# Patient Record
Sex: FEMALE | Race: WHITE | NOT HISPANIC OR LATINO
[De-identification: names, ages, dates, MRNs, and addresses within clinical notes are randomized per-mention and may not be internally consistent; named-entity substitution may affect disease eponyms.]

---

## 2022-07-19 VITALS
TEMPERATURE: 96.3 F | HEART RATE: 70 BPM | WEIGHT: 158.73 LBS | BODY MASS INDEX: 27.1 KG/M2 | HEIGHT: 64 IN | SYSTOLIC BLOOD PRESSURE: 130 MMHG | DIASTOLIC BLOOD PRESSURE: 70 MMHG

## 2022-07-19 VITALS
TEMPERATURE: 96.3 F | BODY MASS INDEX: 27.1 KG/M2 | WEIGHT: 158.73 LBS | SYSTOLIC BLOOD PRESSURE: 130 MMHG | HEIGHT: 64 IN | DIASTOLIC BLOOD PRESSURE: 70 MMHG | HEART RATE: 70 BPM

## 2022-12-01 ENCOUNTER — APPOINTMENT (OUTPATIENT)
Dept: CARDIOLOGY | Facility: CLINIC | Age: 62
End: 2022-12-01

## 2022-12-01 ENCOUNTER — RESULT REVIEW (OUTPATIENT)
Age: 62
End: 2022-12-01

## 2022-12-01 PROCEDURE — 93306 TTE W/DOPPLER COMPLETE: CPT

## 2022-12-05 DIAGNOSIS — Z78.9 OTHER SPECIFIED HEALTH STATUS: ICD-10-CM

## 2022-12-05 RX ORDER — WARFARIN 7.5 MG/1
7.5 TABLET ORAL
Refills: 0 | Status: ACTIVE | COMMUNITY

## 2022-12-05 RX ORDER — FLUOXETINE HYDROCHLORIDE 40 MG/1
40 CAPSULE ORAL
Refills: 0 | Status: ACTIVE | COMMUNITY

## 2022-12-05 RX ORDER — HYDROXYCHLOROQUINE SULFATE 200 MG/1
200 TABLET ORAL
Refills: 0 | Status: ACTIVE | COMMUNITY

## 2023-01-13 ENCOUNTER — NON-APPOINTMENT (OUTPATIENT)
Age: 63
End: 2023-01-13

## 2023-01-13 RX ORDER — FLUOXETINE HYDROCHLORIDE 40 MG/1
40 CAPSULE ORAL
Refills: 0 | Status: ACTIVE | COMMUNITY

## 2023-01-18 ENCOUNTER — APPOINTMENT (OUTPATIENT)
Dept: CARDIOLOGY | Facility: CLINIC | Age: 63
End: 2023-01-18

## 2023-04-10 ENCOUNTER — APPOINTMENT (OUTPATIENT)
Dept: CARDIOLOGY | Facility: CLINIC | Age: 63
End: 2023-04-10
Payer: COMMERCIAL

## 2023-04-10 VITALS — WEIGHT: 152 LBS | BODY MASS INDEX: 27.97 KG/M2 | HEIGHT: 62 IN

## 2023-04-10 PROCEDURE — 99204 OFFICE O/P NEW MOD 45 MIN: CPT

## 2023-04-10 NOTE — DISCUSSION/SUMMARY
[FreeTextEntry1] : pt with abnormal echocardiogram \par pt echo with ? septal/inferior hypo with GLS down \par will get lexiscan stress nuclear as unable to exercise on treadmill \par get bloodwork.

## 2023-04-10 NOTE — HISTORY OF PRESENT ILLNESS
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 OA, DD@ \par \par pt f/u with INR with DR. TINOCO \par \par 12/22: lvef 50%, mid septal ant/inf hypokinesis, E/e': 18 e' sept: 0.06 m/s GLS -17% mild LAE \par pt had covid in january and pt says not having symptoms. \par pt says not sob and not with fast exertion. pt rides a stationary bike and walks dog daily. \par pt denies cp.

## 2023-04-10 NOTE — PHYSICAL EXAM
[Normal Venous Pressure] : normal venous pressure [Normal S1, S2] : normal S1, S2 [Clear Lung Fields] : clear lung fields [Soft] : abdomen soft [No Edema] : no edema

## 2023-05-03 ENCOUNTER — OUTPATIENT (OUTPATIENT)
Dept: OUTPATIENT SERVICES | Facility: HOSPITAL | Age: 63
LOS: 1 days | End: 2023-05-03
Payer: COMMERCIAL

## 2023-05-03 DIAGNOSIS — Z00.8 ENCOUNTER FOR OTHER GENERAL EXAMINATION: ICD-10-CM

## 2023-05-03 DIAGNOSIS — I50.32 CHRONIC DIASTOLIC (CONGESTIVE) HEART FAILURE: ICD-10-CM

## 2023-05-03 PROCEDURE — 78452 HT MUSCLE IMAGE SPECT MULT: CPT | Mod: 26

## 2023-05-03 PROCEDURE — 93018 CV STRESS TEST I&R ONLY: CPT

## 2023-05-03 PROCEDURE — A9500: CPT

## 2023-05-03 PROCEDURE — 78452 HT MUSCLE IMAGE SPECT MULT: CPT

## 2023-05-04 DIAGNOSIS — I50.32 CHRONIC DIASTOLIC (CONGESTIVE) HEART FAILURE: ICD-10-CM

## 2023-05-12 DIAGNOSIS — R07.9 CHEST PAIN, UNSPECIFIED: ICD-10-CM

## 2023-05-13 DIAGNOSIS — R07.9 CHEST PAIN, UNSPECIFIED: ICD-10-CM

## 2023-10-16 ENCOUNTER — APPOINTMENT (OUTPATIENT)
Dept: CARDIOLOGY | Facility: CLINIC | Age: 63
End: 2023-10-16

## 2023-10-23 ENCOUNTER — APPOINTMENT (OUTPATIENT)
Dept: CARDIOLOGY | Facility: CLINIC | Age: 63
End: 2023-10-23
Payer: COMMERCIAL

## 2023-10-23 VITALS — BODY MASS INDEX: 28.52 KG/M2 | HEART RATE: 89 BPM | HEIGHT: 62 IN | WEIGHT: 155 LBS | OXYGEN SATURATION: 99 %

## 2023-10-23 DIAGNOSIS — R07.89 OTHER CHEST PAIN: ICD-10-CM

## 2023-10-23 DIAGNOSIS — R94.31 ABNORMAL ELECTROCARDIOGRAM [ECG] [EKG]: ICD-10-CM

## 2023-10-23 PROCEDURE — 99214 OFFICE O/P EST MOD 30 MIN: CPT

## 2024-01-03 RX ORDER — NIFEDIPINE 30 MG/1
30 TABLET, EXTENDED RELEASE ORAL DAILY
Qty: 90 | Refills: 3 | Status: ACTIVE | COMMUNITY
Start: 1900-01-01 | End: 1900-01-01

## 2024-03-28 ENCOUNTER — APPOINTMENT (OUTPATIENT)
Dept: CARDIOLOGY | Facility: CLINIC | Age: 64
End: 2024-03-28
Payer: COMMERCIAL

## 2024-03-28 PROCEDURE — 93306 TTE W/DOPPLER COMPLETE: CPT

## 2024-04-22 ENCOUNTER — APPOINTMENT (OUTPATIENT)
Dept: CARDIOLOGY | Facility: CLINIC | Age: 64
End: 2024-04-22
Payer: COMMERCIAL

## 2024-04-22 VITALS — DIASTOLIC BLOOD PRESSURE: 78 MMHG | SYSTOLIC BLOOD PRESSURE: 124 MMHG | HEART RATE: 60 BPM

## 2024-04-22 VITALS — BODY MASS INDEX: 28.16 KG/M2 | WEIGHT: 153 LBS | HEIGHT: 62 IN

## 2024-04-22 DIAGNOSIS — I50.32 CHRONIC DIASTOLIC (CONGESTIVE) HEART FAILURE: ICD-10-CM

## 2024-04-22 DIAGNOSIS — Z00.00 ENCOUNTER FOR GENERAL ADULT MEDICAL EXAMINATION W/OUT ABNORMAL FINDINGS: ICD-10-CM

## 2024-04-22 DIAGNOSIS — M32.9 SYSTEMIC LUPUS ERYTHEMATOSUS, UNSPECIFIED: ICD-10-CM

## 2024-04-22 DIAGNOSIS — D68.62 LUPUS ANTICOAGULANT SYNDROME: ICD-10-CM

## 2024-04-22 DIAGNOSIS — I82.409 ACUTE EMBOLISM AND THROMBOSIS OF UNSPECIFIED DEEP VEINS OF UNSPECIFIED LOWER EXTREMITY: ICD-10-CM

## 2024-04-22 DIAGNOSIS — D68.59 OTHER PRIMARY THROMBOPHILIA: ICD-10-CM

## 2024-04-22 PROCEDURE — 99214 OFFICE O/P EST MOD 30 MIN: CPT | Mod: 25

## 2024-04-22 PROCEDURE — 93000 ELECTROCARDIOGRAM COMPLETE: CPT

## 2024-04-22 NOTE — HISTORY OF PRESENT ILLNESS
[FreeTextEntry1] : pt with SLE, SJOGRENS SYNDROME, APA on  coumadin after DVT/PE in  , repeat DVT in 2019  while on coumadin, CALISTA on EKG. OA, DD2 echo: mild septal anterior inferior hypokinesis   pt f/u with INR with DR. TINOCO (HEM/ONC in [place].   : lvef 50%, mid septal ant/inf hypokinesis, E/e': 18 e' sept: 0.06 m/s GLS -17% mild LAE  pt had covid in january and pt says not having symptoms.  pt says not sob and not with fast exertion. pt rides a stationary bike and walks dog daily.  pt denies cp.   10/23/23:  23: Normal myocardial perfusion. No evidence of ischemia or infarction. lvef 65% . HDL: 50, T, LDL: 82, BNP: 15 WBC: 2.5 and patient here for f/u, pt had elevated wbc, pt having a cough all summer intemittently and no sob. pt says SLE is stable. pt denies wheezing. pt was on steroids in july for her eyes.   24: 3/28/24: EF: 50-55%, E' sept: 0.07 m/s E/e': 10.5 LVOT VTI: 18.3 cm, GLS: -17.9%, DD1, borderline LAE, trace TR, JANY lvfp improved vs past and LAE size improved.  :  wbc: 2.5  EKG: nsr with LAE  pt says walking the dog and some pilates at home, no cp or sob pt denies cp of sob and active. pt has fatigue with lupus at times but no exacerbations.  u/a proteinuria 1+ f/u pmd.

## 2024-04-22 NOTE — DISCUSSION/SUMMARY
[FreeTextEntry1] : pt with abnormal echocardiogram  pt echo with GLS down  STRESS NUCLEAR Negative 2023  continue nifedipine 30 mg  continue coumadin f/u hem/onc  f/u rheumatology on plaquenil  get bloodwork. f/u in 6 months  get venous u/s.  [EKG obtained to assist in diagnosis and management of assessed problem(s)] : EKG obtained to assist in diagnosis and management of assessed problem(s)

## 2024-07-17 ENCOUNTER — APPOINTMENT (OUTPATIENT)
Dept: CARDIOLOGY | Facility: CLINIC | Age: 64
End: 2024-07-17

## 2024-07-17 PROCEDURE — 93970 EXTREMITY STUDY: CPT

## 2024-10-17 ENCOUNTER — APPOINTMENT (OUTPATIENT)
Dept: CARDIOLOGY | Facility: CLINIC | Age: 64
End: 2024-10-17
Payer: COMMERCIAL

## 2024-10-17 VITALS
DIASTOLIC BLOOD PRESSURE: 79 MMHG | HEART RATE: 80 BPM | WEIGHT: 155 LBS | BODY MASS INDEX: 28.52 KG/M2 | SYSTOLIC BLOOD PRESSURE: 150 MMHG | HEIGHT: 62 IN

## 2024-10-17 DIAGNOSIS — M32.9 SYSTEMIC LUPUS ERYTHEMATOSUS, UNSPECIFIED: ICD-10-CM

## 2024-10-17 DIAGNOSIS — I50.32 CHRONIC DIASTOLIC (CONGESTIVE) HEART FAILURE: ICD-10-CM

## 2024-10-17 DIAGNOSIS — D68.59 OTHER PRIMARY THROMBOPHILIA: ICD-10-CM

## 2024-10-17 DIAGNOSIS — D68.62 LUPUS ANTICOAGULANT SYNDROME: ICD-10-CM

## 2024-10-17 DIAGNOSIS — R94.31 ABNORMAL ELECTROCARDIOGRAM [ECG] [EKG]: ICD-10-CM

## 2024-10-17 DIAGNOSIS — I82.409 ACUTE EMBOLISM AND THROMBOSIS OF UNSPECIFIED DEEP VEINS OF UNSPECIFIED LOWER EXTREMITY: ICD-10-CM

## 2024-10-17 PROCEDURE — 99214 OFFICE O/P EST MOD 30 MIN: CPT

## 2024-10-17 PROCEDURE — G2211 COMPLEX E/M VISIT ADD ON: CPT | Mod: NC

## 2025-04-07 ENCOUNTER — INPATIENT (INPATIENT)
Facility: HOSPITAL | Age: 65
LOS: 8 days | Discharge: HOME HEALTH | DRG: 813 | End: 2025-04-16
Attending: INTERNAL MEDICINE | Admitting: INTERNAL MEDICINE
Payer: COMMERCIAL

## 2025-04-07 ENCOUNTER — APPOINTMENT (OUTPATIENT)
Dept: CARDIOLOGY | Facility: CLINIC | Age: 65
End: 2025-04-07

## 2025-04-07 VITALS
WEIGHT: 160.06 LBS | RESPIRATION RATE: 18 BRPM | DIASTOLIC BLOOD PRESSURE: 61 MMHG | HEART RATE: 76 BPM | TEMPERATURE: 98 F | OXYGEN SATURATION: 98 % | SYSTOLIC BLOOD PRESSURE: 91 MMHG

## 2025-04-07 DIAGNOSIS — R79.1 ABNORMAL COAGULATION PROFILE: ICD-10-CM

## 2025-04-07 LAB
ALBUMIN SERPL ELPH-MCNC: 3.5 G/DL — SIGNIFICANT CHANGE UP (ref 3.5–5.2)
ALP SERPL-CCNC: 51 U/L — SIGNIFICANT CHANGE UP (ref 30–115)
ALT FLD-CCNC: 23 U/L — SIGNIFICANT CHANGE UP (ref 0–41)
ANION GAP SERPL CALC-SCNC: 12 MMOL/L — SIGNIFICANT CHANGE UP (ref 7–14)
APPEARANCE UR: ABNORMAL
APTT BLD: 78.1 SEC — CRITICAL HIGH (ref 27–39.2)
APTT BLD: 79.5 SEC — CRITICAL HIGH (ref 27–39.2)
AST SERPL-CCNC: 34 U/L — SIGNIFICANT CHANGE UP (ref 0–41)
BASOPHILS # BLD AUTO: 0.03 K/UL — SIGNIFICANT CHANGE UP (ref 0–0.2)
BASOPHILS NFR BLD AUTO: 0.4 % — SIGNIFICANT CHANGE UP (ref 0–1)
BILIRUB SERPL-MCNC: 0.7 MG/DL — SIGNIFICANT CHANGE UP (ref 0.2–1.2)
BILIRUB UR-MCNC: ABNORMAL
BUN SERPL-MCNC: 18 MG/DL — SIGNIFICANT CHANGE UP (ref 10–20)
CALCIUM SERPL-MCNC: 8.5 MG/DL — SIGNIFICANT CHANGE UP (ref 8.4–10.5)
CHLORIDE SERPL-SCNC: 97 MMOL/L — LOW (ref 98–110)
CO2 SERPL-SCNC: 22 MMOL/L — SIGNIFICANT CHANGE UP (ref 17–32)
COLOR SPEC: ABNORMAL
CREAT SERPL-MCNC: 0.7 MG/DL — SIGNIFICANT CHANGE UP (ref 0.7–1.5)
DIFF PNL FLD: ABNORMAL
EGFR: 97 ML/MIN/1.73M2 — SIGNIFICANT CHANGE UP
EGFR: 97 ML/MIN/1.73M2 — SIGNIFICANT CHANGE UP
EOSINOPHIL # BLD AUTO: 0.02 K/UL — SIGNIFICANT CHANGE UP (ref 0–0.7)
EOSINOPHIL NFR BLD AUTO: 0.3 % — SIGNIFICANT CHANGE UP (ref 0–8)
GAS PNL BLDV: SIGNIFICANT CHANGE UP
GLUCOSE SERPL-MCNC: 97 MG/DL — SIGNIFICANT CHANGE UP (ref 70–99)
GLUCOSE UR QL: NEGATIVE MG/DL — SIGNIFICANT CHANGE UP
HCT VFR BLD CALC: 24.8 % — LOW (ref 37–47)
HCT VFR BLD CALC: 26.8 % — LOW (ref 37–47)
HGB BLD-MCNC: 8.6 G/DL — LOW (ref 12–16)
HGB BLD-MCNC: 9.3 G/DL — LOW (ref 12–16)
IMM GRANULOCYTES NFR BLD AUTO: 0.4 % — HIGH (ref 0.1–0.3)
INR BLD: 13.12 RATIO — CRITICAL HIGH (ref 0.65–1.3)
INR BLD: 13.97 RATIO — CRITICAL HIGH (ref 0.65–1.3)
KETONES UR-MCNC: NEGATIVE MG/DL — SIGNIFICANT CHANGE UP
LEUKOCYTE ESTERASE UR-ACNC: ABNORMAL
LIDOCAIN IGE QN: 28 U/L — SIGNIFICANT CHANGE UP (ref 7–60)
LYMPHOCYTES # BLD AUTO: 0.61 K/UL — LOW (ref 1.2–3.4)
LYMPHOCYTES # BLD AUTO: 8.8 % — LOW (ref 20.5–51.1)
MAGNESIUM SERPL-MCNC: 1.9 MG/DL — SIGNIFICANT CHANGE UP (ref 1.8–2.4)
MCHC RBC-ENTMCNC: 30.1 PG — SIGNIFICANT CHANGE UP (ref 27–31)
MCHC RBC-ENTMCNC: 30.2 PG — SIGNIFICANT CHANGE UP (ref 27–31)
MCHC RBC-ENTMCNC: 34.7 G/DL — SIGNIFICANT CHANGE UP (ref 32–37)
MCHC RBC-ENTMCNC: 34.7 G/DL — SIGNIFICANT CHANGE UP (ref 32–37)
MCV RBC AUTO: 86.7 FL — SIGNIFICANT CHANGE UP (ref 81–99)
MCV RBC AUTO: 87 FL — SIGNIFICANT CHANGE UP (ref 81–99)
MONOCYTES # BLD AUTO: 0.72 K/UL — HIGH (ref 0.1–0.6)
MONOCYTES NFR BLD AUTO: 10.4 % — HIGH (ref 1.7–9.3)
NEUTROPHILS # BLD AUTO: 5.49 K/UL — SIGNIFICANT CHANGE UP (ref 1.4–6.5)
NEUTROPHILS NFR BLD AUTO: 79.7 % — HIGH (ref 42.2–75.2)
NITRITE UR-MCNC: POSITIVE
NRBC BLD AUTO-RTO: 0 /100 WBCS — SIGNIFICANT CHANGE UP (ref 0–0)
NRBC BLD AUTO-RTO: 0 /100 WBCS — SIGNIFICANT CHANGE UP (ref 0–0)
NT-PROBNP SERPL-SCNC: 150 PG/ML — SIGNIFICANT CHANGE UP (ref 0–300)
PH UR: 5 — SIGNIFICANT CHANGE UP (ref 5–8)
PHOSPHATE SERPL-MCNC: 3 MG/DL — SIGNIFICANT CHANGE UP (ref 2.1–4.9)
PLATELET # BLD AUTO: 239 K/UL — SIGNIFICANT CHANGE UP (ref 130–400)
PLATELET # BLD AUTO: 254 K/UL — SIGNIFICANT CHANGE UP (ref 130–400)
PMV BLD: 12.4 FL — HIGH (ref 7.4–10.4)
PMV BLD: 12.5 FL — HIGH (ref 7.4–10.4)
POTASSIUM SERPL-MCNC: 4 MMOL/L — SIGNIFICANT CHANGE UP (ref 3.5–5)
POTASSIUM SERPL-SCNC: 4 MMOL/L — SIGNIFICANT CHANGE UP (ref 3.5–5)
PROT SERPL-MCNC: 7.3 G/DL — SIGNIFICANT CHANGE UP (ref 6–8)
PROT UR-MCNC: 100 MG/DL
PROTHROM AB SERPL-ACNC: >40 SEC — HIGH (ref 9.95–12.87)
PROTHROM AB SERPL-ACNC: >40 SEC — HIGH (ref 9.95–12.87)
RBC # BLD: 2.85 M/UL — LOW (ref 4.2–5.4)
RBC # BLD: 3.09 M/UL — LOW (ref 4.2–5.4)
RBC # FLD: 13.1 % — SIGNIFICANT CHANGE UP (ref 11.5–14.5)
RBC # FLD: 13.2 % — SIGNIFICANT CHANGE UP (ref 11.5–14.5)
SODIUM SERPL-SCNC: 131 MMOL/L — LOW (ref 135–146)
SP GR SPEC: >1.03 — HIGH (ref 1–1.03)
UROBILINOGEN FLD QL: 0.2 MG/DL — SIGNIFICANT CHANGE UP (ref 0.2–1)
WBC # BLD: 6.9 K/UL — SIGNIFICANT CHANGE UP (ref 4.8–10.8)
WBC # BLD: 7.09 K/UL — SIGNIFICANT CHANGE UP (ref 4.8–10.8)
WBC # FLD AUTO: 6.9 K/UL — SIGNIFICANT CHANGE UP (ref 4.8–10.8)
WBC # FLD AUTO: 7.09 K/UL — SIGNIFICANT CHANGE UP (ref 4.8–10.8)

## 2025-04-07 PROCEDURE — 85730 THROMBOPLASTIN TIME PARTIAL: CPT

## 2025-04-07 PROCEDURE — 74176 CT ABD & PELVIS W/O CONTRAST: CPT | Mod: MC

## 2025-04-07 PROCEDURE — 84484 ASSAY OF TROPONIN QUANT: CPT

## 2025-04-07 PROCEDURE — 36430 TRANSFUSION BLD/BLD COMPNT: CPT

## 2025-04-07 PROCEDURE — 86901 BLOOD TYPING SEROLOGIC RH(D): CPT

## 2025-04-07 PROCEDURE — 70450 CT HEAD/BRAIN W/O DYE: CPT | Mod: MC

## 2025-04-07 PROCEDURE — 85027 COMPLETE CBC AUTOMATED: CPT

## 2025-04-07 PROCEDURE — 81001 URINALYSIS AUTO W/SCOPE: CPT

## 2025-04-07 PROCEDURE — 83735 ASSAY OF MAGNESIUM: CPT

## 2025-04-07 PROCEDURE — 80053 COMPREHEN METABOLIC PANEL: CPT

## 2025-04-07 PROCEDURE — 71260 CT THORAX DX C+: CPT | Mod: 26

## 2025-04-07 PROCEDURE — 36415 COLL VENOUS BLD VENIPUNCTURE: CPT

## 2025-04-07 PROCEDURE — 86850 RBC ANTIBODY SCREEN: CPT

## 2025-04-07 PROCEDURE — 85610 PROTHROMBIN TIME: CPT

## 2025-04-07 PROCEDURE — 99223 1ST HOSP IP/OBS HIGH 75: CPT

## 2025-04-07 PROCEDURE — 86900 BLOOD TYPING SEROLOGIC ABO: CPT

## 2025-04-07 PROCEDURE — 74177 CT ABD & PELVIS W/CONTRAST: CPT | Mod: 26

## 2025-04-07 PROCEDURE — 82962 GLUCOSE BLOOD TEST: CPT

## 2025-04-07 PROCEDURE — P9016: CPT

## 2025-04-07 PROCEDURE — 99285 EMERGENCY DEPT VISIT HI MDM: CPT

## 2025-04-07 PROCEDURE — 93005 ELECTROCARDIOGRAM TRACING: CPT

## 2025-04-07 PROCEDURE — 87086 URINE CULTURE/COLONY COUNT: CPT

## 2025-04-07 PROCEDURE — 80048 BASIC METABOLIC PNL TOTAL CA: CPT

## 2025-04-07 PROCEDURE — 84100 ASSAY OF PHOSPHORUS: CPT

## 2025-04-07 PROCEDURE — 85025 COMPLETE CBC W/AUTO DIFF WBC: CPT

## 2025-04-07 PROCEDURE — 86923 COMPATIBILITY TEST ELECTRIC: CPT

## 2025-04-07 PROCEDURE — 71045 X-RAY EXAM CHEST 1 VIEW: CPT | Mod: 26

## 2025-04-07 RX ORDER — ACETAMINOPHEN 500 MG/5ML
650 LIQUID (ML) ORAL EVERY 6 HOURS
Refills: 0 | Status: DISCONTINUED | OUTPATIENT
Start: 2025-04-07 | End: 2025-04-07

## 2025-04-07 RX ORDER — ONDANSETRON HCL/PF 4 MG/2 ML
4 VIAL (ML) INJECTION EVERY 8 HOURS
Refills: 0 | Status: DISCONTINUED | OUTPATIENT
Start: 2025-04-07 | End: 2025-04-16

## 2025-04-07 RX ORDER — HYDROXYCHLOROQUINE SULFATE 200 MG/1
200 TABLET, FILM COATED ORAL
Refills: 0 | Status: DISCONTINUED | OUTPATIENT
Start: 2025-04-07 | End: 2025-04-16

## 2025-04-07 RX ORDER — FLUOXETINE HYDROCHLORIDE 20 MG/1
40 CAPSULE ORAL DAILY
Refills: 0 | Status: DISCONTINUED | OUTPATIENT
Start: 2025-04-07 | End: 2025-04-16

## 2025-04-07 RX ORDER — ALENDRONATE SODIUM 70 MG/1
1 TABLET ORAL
Refills: 0 | DISCHARGE

## 2025-04-07 RX ORDER — SODIUM CHLORIDE 9 G/1000ML
500 INJECTION, SOLUTION INTRAVENOUS ONCE
Refills: 0 | Status: COMPLETED | OUTPATIENT
Start: 2025-04-07 | End: 2025-04-07

## 2025-04-07 RX ORDER — CYCLOBENZAPRINE HYDROCHLORIDE 15 MG/1
5 CAPSULE, EXTENDED RELEASE ORAL ONCE
Refills: 0 | Status: COMPLETED | OUTPATIENT
Start: 2025-04-07 | End: 2025-04-07

## 2025-04-07 RX ORDER — SODIUM CHLORIDE 9 G/1000ML
1000 INJECTION, SOLUTION INTRAVENOUS
Refills: 0 | Status: DISCONTINUED | OUTPATIENT
Start: 2025-04-07 | End: 2025-04-09

## 2025-04-07 RX ORDER — ACETAMINOPHEN 500 MG/5ML
650 LIQUID (ML) ORAL ONCE
Refills: 0 | Status: COMPLETED | OUTPATIENT
Start: 2025-04-07 | End: 2025-04-07

## 2025-04-07 RX ORDER — CEFTRIAXONE 500 MG/1
1000 INJECTION, POWDER, FOR SOLUTION INTRAMUSCULAR; INTRAVENOUS EVERY 24 HOURS
Refills: 0 | Status: COMPLETED | OUTPATIENT
Start: 2025-04-07 | End: 2025-04-09

## 2025-04-07 RX ORDER — NIFEDIPINE 30 MG
1 TABLET, EXTENDED RELEASE 24 HR ORAL
Refills: 0 | DISCHARGE

## 2025-04-07 RX ORDER — FLUOXETINE HYDROCHLORIDE 20 MG/1
1 CAPSULE ORAL
Refills: 0 | DISCHARGE

## 2025-04-07 RX ORDER — HYDROXYCHLOROQUINE SULFATE 200 MG/1
1 TABLET, FILM COATED ORAL
Refills: 0 | DISCHARGE

## 2025-04-07 RX ADMIN — Medication 2 MILLIGRAM(S): at 21:44

## 2025-04-07 RX ADMIN — Medication 650 MILLIGRAM(S): at 16:00

## 2025-04-07 RX ADMIN — Medication 102 MILLIGRAM(S): at 22:14

## 2025-04-07 RX ADMIN — Medication 10 MILLIGRAM(S): at 18:17

## 2025-04-07 RX ADMIN — CYCLOBENZAPRINE HYDROCHLORIDE 5 MILLIGRAM(S): 15 CAPSULE, EXTENDED RELEASE ORAL at 16:00

## 2025-04-07 RX ADMIN — SODIUM CHLORIDE 500 MILLILITER(S): 9 INJECTION, SOLUTION INTRAVENOUS at 16:01

## 2025-04-07 RX ADMIN — SODIUM CHLORIDE 75 MILLILITER(S): 9 INJECTION, SOLUTION INTRAVENOUS at 21:45

## 2025-04-07 NOTE — H&P ADULT - NSHPLABSRESULTS_GEN_ALL_CORE
Labs:                        8.6    7.09  )-----------( 239      ( 07 Apr 2025 20:01 )             24.8     04-07    131[L]  |  97[L]  |  18  ----------------------------<  97  4.0   |  22  |  0.7    Ca    8.5      07 Apr 2025 16:13  Phos  3.0     04-07  Mg     1.9     04-07    TPro  7.3  /  Alb  3.5  /  TBili  0.7  /  DBili  x   /  AST  34  /  ALT  23  /  AlkPhos  51  04-07      Urinalysis with Rflx Culture (collected 04-07-25 @ 19:33)      Creatinine: 0.7 mg/dL (04-07-25 @ 16:13)    WBC Count: 7.09 K/uL (04-07-25 @ 20:01)  WBC Count: 6.90 K/uL (04-07-25 @ 16:13)  Alkaline Phosphatase: 51 U/L (04-07-25 @ 16:13)  Alanine Aminotransferase (ALT/SGPT): 23 U/L (04-07-25 @ 16:13)  Aspartate Aminotransferase (AST/SGOT): 34 U/L (04-07-25 @ 16:13)  Bilirubin Total: 0.7 mg/dL (04-07-25 @ 16:13)

## 2025-04-07 NOTE — ED ADULT NURSE NOTE - NSFALLHARMRISKINTERV_ED_ALL_ED

## 2025-04-07 NOTE — H&P ADULT - BIRTH SEX
Female Acitretin Counseling:  I discussed with the patient the risks of acitretin including but not limited to hair loss, dry lips/skin/eyes, liver damage, hyperlipidemia, depression/suicidal ideation, photosensitivity.  Serious rare side effects can include but are not limited to pancreatitis, pseudotumor cerebri, bony changes, clot formation/stroke/heart attack.  Patient understands that alcohol is contraindicated since it can result in liver toxicity and significantly prolong the elimination of the drug by many years.

## 2025-04-07 NOTE — ED ADULT NURSE REASSESSMENT NOTE - NS ED NURSE REASSESS COMMENT FT1
pt is alert and awake speaking in full sentences. IV intact, vital stable, no s/s of distress.    fall precaution in place, bed to lowest position, rails up.

## 2025-04-07 NOTE — ED ADULT TRIAGE NOTE - CHIEF COMPLAINT QUOTE
Patient complaining of right sided back pain, was given muscle relaxers which has not helped- patient also has blood in her urine and her INR when checked this morning was greater than 8. Patient also states her BP has been low (was 80s systolically at her doctors)

## 2025-04-07 NOTE — H&P ADULT - NSHPPHYSICALEXAM_GEN_ALL_CORE
CONSTITUTIONAL: NAD   EYES: PERRLA and symmetric, EOMI,   ENMT: Oral mucosa with moist membranes.   NECK: Supple  RESP:   CV: RRR, +S1S2,no peripheral edema  GI: Soft, Non tender ; BS present.   MSK:  No clubbing ; No cyanosis   SKIN:  NEURO: Grossly Intact CONSTITUTIONAL: NAD ; laying in bed on left side complaining of pain   EYES: PERRLA and symmetric, EOMI,   ENMT: Oral mucosa with moist membranes.   NECK: Supple  RESP: Bilateral equal air entry ; Normal Vesicular breath sounds.   CV: RRR, +S1S2,no peripheral edema  GI: Soft, Non tender ; BS present.   MSK:  No clubbing ; No cyanosis ; Right sided mid back to lower back paraspinal swelling present ; Ecchymotic patches noted.   SKIN : no malar rash ; Few ecchymotic patches noted.   NEURO: Grossly Intact

## 2025-04-07 NOTE — H&P ADULT - HISTORY OF PRESENT ILLNESS
Incomplete Note:     64-year-old female with a past medical history of SLE, DVT/PE on Coumadin, HFmEF ( EF 50%)  presented to the emergency department complaining of mid lower back pain for 8 days.      Patient reports relief with acetaminophen and cyclobenzaprine but it is temporary.  Patient also reports hematuria.  Patient checked her INR 3 days ago at home which showed a level of 8.  Patient states that her back pain is exacerbated with motion and reports shortness of breath on exertion.    Denies lower extremity weakness and is able to ambulate.  No recent trauma.    In the ED ;   Vitals : BP 91/61 ; HR 76 ; RR 18 ; Afebrile ; Saturating 98% on RA.     Labs:   WBC 6.90 Hb 9.3 MCV 86.7 Platelets 254 k   PT/INR : >40/13.97 ; aptt 78.1   Na 131 K 4.0   BUN/Creatinine 18/0.7     VBG : pH 7.41 Co2 40 HCO3 25 Lactate 1.1     UA positive     Imaging:   Chest Xray: Right basilar mild atelectasis. No acute infiltrate. No pneumothorax.    CT Chest/Abdomen/Pelvis:   1. Right paraspinal intramuscular hematoma extending from the lower thorax approximately T9 through approximately L4, difficult to quantitate but expansile and measures approximately 7.1 x 5.4 x 25 cm.  2. No evidence of acute intrathoracic or intra-abdominal pelvic pathology.    s/p 500 cc Fluid bolus ; Oral Vitamin K.     Admitted to medicine.    64-year-old female with a past medical history of SLE/ Antiphospholipid syndrome , DVT/PE on Coumadin (1990) , Hypertension ; Depression presented to the emergency department complaining of right lower back pain for 8 days.   She endorses that she started having right sided mid- lower back pain after she woke up 8 days back. Back pain was acute in onset; aggravated on movement. Initially did not take any medications for it. She went to the urgent care and started taking tylenol and Muscle Relaxant( Cyclobenzaprine) but didnot help. 4 days ago she checked her INR and found to be 8 so stopped taking her Warfarin. Does not believe that she had any kind of trauma.   She also started having mery hematuria. Does not mention any burning urination ; increased frequency or incontinence.     Has not changed her dietary habits. Was eating mandarin oranges.   No new medications was added.     Was diagnosed with SLE/APLS in 1990s. Was Diagnosed with DVT/PE 1990 ; started taking Coumadin , Mentions had DVT initially and started COumadin but had a PE on Coumadin.   Hematologist : Dr. Mota ( [facility]) and Rheumatologist Dr. Whitt ( [facility]).     In the ED ;   Vitals : BP 91/61 ; HR 76 ; RR 18 ; Afebrile ; Saturating 98% on RA.     Labs:   WBC 6.90 Hb 9.3 MCV 86.7 Platelets 254 k   PT/INR : >40/13.97 ; aptt 78.1   Na 131 K 4.0   BUN/Creatinine 18/0.7     VBG : pH 7.41 Co2 40 HCO3 25 Lactate 1.1     UA positive     Imaging:   Chest Xray: Right basilar mild atelectasis. No acute infiltrate. No pneumothorax.    CT Chest/Abdomen/Pelvis:   1. Right paraspinal intramuscular hematoma extending from the lower thorax approximately T9 through approximately L4, difficult to quantitate but expansile and measures approximately 7.1 x 5.4 x 25 cm.  2. No evidence of acute intrathoracic or intra-abdominal pelvic pathology.    s/p 500 cc Fluid bolus ; Oral Vitamin K.     Admitted to medicine.    64-year-old female with a past medical history of SLE/ Antiphospholipid syndrome , DVT/PE on Coumadin (1990) , Hypertension ; Depression presented to the emergency department complaining of right lower back pain for 8 days.   She endorses that she started having right sided mid- lower back pain after she woke up 8 days back. Back pain was acute in onset; aggravated on movement. Initially did not take any medications for it. She went to the urgent care and started taking tylenol and Muscle Relaxant( Cyclobenzaprine) but didnot help. 4 days ago she checked her INR and found to be 8 so stopped taking her Warfarin. INR on 03/30 was 3.2. Does not believe that she had any kind of trauma.   She also started having mery hematuria. Does not mention any burning urination ; increased frequency or incontinence.     Has not changed her dietary habits. Was eating mandarin oranges.   No new medications was added.     Was diagnosed with SLE/APLS in 1990s. Was Diagnosed with DVT/PE 1990 ; started taking Coumadin , Mentions had DVT initially and started COumadin but had a PE on Coumadin.   Hematologist : Dr. Mota ( [facility]) and Rheumatologist Dr. Whitt ( [facility]).     In the ED ;   Vitals : BP 91/61 ; HR 76 ; RR 18 ; Afebrile ; Saturating 98% on RA.     Labs:   WBC 6.90 Hb 9.3 MCV 86.7 Platelets 254 k   PT/INR : >40/13.97 ; aptt 78.1   Na 131 K 4.0   BUN/Creatinine 18/0.7     VBG : pH 7.41 Co2 40 HCO3 25 Lactate 1.1     UA positive     Imaging:   Chest Xray: Right basilar mild atelectasis. No acute infiltrate. No pneumothorax.    CT Chest/Abdomen/Pelvis:   1. Right paraspinal intramuscular hematoma extending from the lower thorax approximately T9 through approximately L4, difficult to quantitate but expansile and measures approximately 7.1 x 5.4 x 25 cm.  2. No evidence of acute intrathoracic or intra-abdominal pelvic pathology.    s/p 500 cc Fluid bolus ; Oral Vitamin K.     Admitted to medicine.    64-year-old female with a past medical history of SLE/ Antiphospholipid syndrome , DVT/PE on Coumadin (1990) , Hypertension ; Depression presented to the emergency department complaining of right lower back pain for 8 days.   She endorses that she started having right sided mid- lower back pain after she woke up 8 days back. Back pain was acute in onset; aggravated on movement. Initially did not take any medications for it. She went to the urgent care and started taking tylenol and Muscle Relaxant( Cyclobenzaprine) but didnot help. 4 days ago she checked her INR and found to be 8 so stopped taking her Warfarin. INR on 03/30 was 13.2. Does not believe that she had any kind of trauma.   She also started having mery hematuria. Does not mention any burning urination ; increased frequency or incontinence.     Has not changed her dietary habits. Was eating mandarin oranges.   No new medications was added.     Was diagnosed with SLE/APLS in 1990s. Was Diagnosed with DVT/PE 1990 ; started taking Coumadin , Mentions had DVT initially and started COumadin but had a PE on Coumadin.   Hematologist : Dr. Mota ( [facility]) and Rheumatologist Dr. Whitt ( [facility]).     In the ED ;   Vitals : BP 91/61 ; HR 76 ; RR 18 ; Afebrile ; Saturating 98% on RA.     Labs:   WBC 6.90 Hb 9.3 MCV 86.7 Platelets 254 k   PT/INR : >40/13.97 ; aptt 78.1   Na 131 K 4.0   BUN/Creatinine 18/0.7     VBG : pH 7.41 Co2 40 HCO3 25 Lactate 1.1     UA positive     Imaging:   Chest Xray: Right basilar mild atelectasis. No acute infiltrate. No pneumothorax.    CT Chest/Abdomen/Pelvis:   1. Right paraspinal intramuscular hematoma extending from the lower thorax approximately T9 through approximately L4, difficult to quantitate but expansile and measures approximately 7.1 x 5.4 x 25 cm.  2. No evidence of acute intrathoracic or intra-abdominal pelvic pathology.    s/p 500 cc Fluid bolus ; Oral Vitamin K.     Admitted to medicine.

## 2025-04-07 NOTE — H&P ADULT - NSICDXPASTMEDICALHX_GEN_ALL_CORE_FT
PAST MEDICAL HISTORY:  DVT, lower extremity     H/O antiphospholipid syndrome     Heart failure      PAST MEDICAL HISTORY:  DVT, lower extremity     H/O antiphospholipid syndrome     Heart failure     Hypertension     Pulmonary embolism

## 2025-04-07 NOTE — H&P ADULT - ASSESSMENT
INCOMPLETE NOTE :     # Paraspinal Hematoma in the setting of Supratherapeutic INR 2/2 Coumadin use   # PE/DVT On Coumadin:   - Came for back pain x 8 days.   - On Arrival BP on softer side.   - CT Chest/Abdomen/Pelvis : Right paraspinal intramuscular hematoma extending from the lower thorax approximately T9 through approximately L4, difficult to quantitate but expansile and measures approximately 7.1 x 5.4 x 25 cm.  - s/p Oral Vitamin K given in the ED.   - Hb on presentation 9.3 ( No Baseline available ) >> Dropped to 8.3  - Will give IV Vitamin K   - Hold all AC at the moment ; Daily INR; INR Range 2-3   - Active type and screen     # Pyuria   - UA positive   - Urine Culture pending   - Symptomatic ??   -     # SLE   # Antiphospholipid Syndrome :   - No leucopenia; Platelets normal   -       # HFmrEF :   - EF as mentioned outpatient records 50%   - Euvolemic?       # MISC :   - DVT : Hold for now.   - GI : PPI   - Diet :   - Activity : Bed rest for now.     Disp; Admit to medicine     Pending :          64-year-old female with a past medical history of SLE/ Antiphospholipid syndrome , DVT/PE on Coumadin (1990) , Hypertension ; Depression presented to the emergency department complaining of right lower back pain for 8 days.   She endorses that she started having right sided mid- lower back pain after she woke up 8 days back. Back pain was acute in onset; aggravated on movement. Initially did not take any medications for it. She went to the urgent care and started taking tylenol and Muscle Relaxant( Cyclobenzaprine) but didnot help. 4 days ago she checked her INR and found to be 8 so stopped taking her Warfarin. Does not believe that she had any kind of trauma.   She also started having mery hematuria. Does not mention any burning urination ; increased frequency or incontinence.     Has not changed her dietary habits. Was eating mandarin oranges.   No new medications was added.     Was diagnosed with SLE/APLS in 1990s. Was Diagnosed with DVT/PE 1990 ; started taking Coumadin , Mentions had DVT initially and started COumadin but had a PE on Coumadin.   Hematologist : Dr. Mota ( [facility]) and Rheumatologist Dr. Whitt ( [facility]).     # Paraspinal Hematoma in the setting of Supratherapeutic INR 2/2 Coumadin use   # PE/DVT On Coumadin:   # SLE   # Antiphospholipid Syndrome :   - Came for back pain x 8 days. INR 4 days back was 8( Checked at home ) >> Stopped Warfarin , Today INR increased to 13.97.   - On Arrival BP on softer side.   - CT Chest/Abdomen/Pelvis : Right paraspinal intramuscular hematoma extending from the lower thorax approximately T9 through approximately L4, difficult to quantitate but expansile and measures approximately 7.1 x 5.4 x 25 cm.  - s/p Oral Vitamin K given in the ED. INR Rechecked INR 13.12.   - Hb on presentation 9.3 ( No Baseline available ) >> Dropped to 8.3  - Liver function Normal.   - Will give IV Vitamin K 10 mg ; Repeat INR. Might Need FFP if concern for active bleeding. Type and screen sent.   - Hold all AC at the moment ; Daily INR; INR Range 2-3   - No Acetaminophen to be given.( Interaction noted) ; No NSAIDs   - Active type and screen  - Monitor HB; Transfusion threshold < 8.  - Hematology consult.   - Rheumatology consult.     # Pyuria/ Cystitis:   # Hematuria  - UA positive   - Urine Culture pending   - Will treat with Rocephin for 3 days.   - Monitor URinary output >> If decreased consider Bladder scan   - Might need Outpatient Urology.       # ??? HFmrEF : ( ?? EF 50%)   # Hypertension   - EF as mentioned outpatient records 50%   - Euvolemic on examination   - hold Antihypertensive ( Nifedipine Xl 30 mg OD)       # MISC :   - DVT : Hold for now.   - GI : PPI   - Diet : DASH//tlc   - Activity : Bed rest for now.     Disp; Admit to medicine   Full code.              64-year-old female with a past medical history of SLE/ Antiphospholipid syndrome , DVT/PE on Coumadin (1990) , Hypertension ; Depression presented to the emergency department complaining of right lower back pain for 8 days.   She endorses that she started having right sided mid- lower back pain after she woke up 8 days back. Back pain was acute in onset; aggravated on movement. Initially did not take any medications for it. She went to the urgent care and started taking tylenol and Muscle Relaxant( Cyclobenzaprine) but didnot help. 4 days ago she checked her INR and found to be 8 so stopped taking her Warfarin. Does not believe that she had any kind of trauma.   She also started having mery hematuria. Does not mention any burning urination ; increased frequency or incontinence.     Has not changed her dietary habits. Was eating mandarin oranges.   No new medications was added.     Was diagnosed with SLE/APLS in 1990s. Was Diagnosed with DVT/PE 1990 ; started taking Coumadin , Mentions had DVT initially and started COumadin but had a PE on Coumadin.   Hematologist : Dr. Mota ( [facility]) and Rheumatologist Dr. Whitt ( [facility]).     # Paraspinal Hematoma in the setting of Supratherapeutic INR 2/2 Coumadin use   # PE/DVT On Coumadin:   # SLE   # Antiphospholipid Syndrome :   # Anemia from blood loss  - Came for back pain x 8 days. INR 4 days back was 8( Checked at home ) >> Stopped Warfarin , Today INR increased to 13.97.   - On Arrival BP on softer side.   - CT Chest/Abdomen/Pelvis : Right paraspinal intramuscular hematoma extending from the lower thorax approximately T9 through approximately L4, difficult to quantitate but expansile and measures approximately 7.1 x 5.4 x 25 cm.  - s/p Oral Vitamin K given in the ED. INR Rechecked INR 13.12.   - Hb on presentation 9.3 (Baseline hgb 13.2 in Feb 2025) >> Dropped to 8.3  - Liver function Normal.   - Will give IV Vitamin K 10 mg ; Repeat INR. Might Need FFP if concern for active bleeding. Type and screen sent.   - Hold all AC at the moment ; Daily INR; INR Range 2-3   - No Acetaminophen to be given.( Interaction noted) ; No NSAIDs   - Active type and screen  - Monitor HB; Transfusion threshold < 8.  - Hematology consult.   - Rheumatology consult.     # Pyuria/ Cystitis:   # Hematuria  - UA positive   - Urine Culture pending   - Will treat with Rocephin for 3 days.   - Monitor Urinary output >> If decreased consider Bladder scan   - Might need Outpatient Urology.     #Hyponatremia  - May be due to poor po intake  - on IVF  - trend Na level. further work up with serum osm, urine osm, Na and nephro eval if repeat Na persistently low and/or worsening.    # ??? HFmrEF : ( ?? EF 50%)   # Hypertension   - EF as mentioned outpatient records 50%   - Euvolemic on examination   - hold Antihypertensive ( Nifedipine Xl 30 mg OD)       # MISC :   - DVT : Hold for now.   - GI : PPI   - Diet : DASH//tlc   - Activity : Bed rest for now.     Disp; Admit to medicine   Full code.

## 2025-04-07 NOTE — ED PROVIDER NOTE - OBJECTIVE STATEMENT
64-year-old female with a past medical history of SLE, DVT/PE on Coumadin, CHF with ejection fraction of 50% presents to the emergency department complaining of mid lower back pain for 8 days.  Patient reports relief with acetaminophen and cyclobenzaprine but it is temporary.  Patient also reports hematuria.  Patient checked her INR 3 days ago at home which showed a level of 8.  Patient states that her back pain is exacerbated with motion and reports shortness of breath on exertion.  Denies lower extremity weakness and is able to ambulate.  No recent trauma.

## 2025-04-07 NOTE — ED PROVIDER NOTE - PHYSICAL EXAMINATION
VITAL SIGNS: I have reviewed nursing notes and confirm.  CONSTITUTIONAL: Well-developed; well-nourished; in no acute distress.  SKIN: Skin exam is warm and dry, no acute rash. Appears pale  HEAD: Normocephalic; atraumatic.  NECK: Supple; non tender.  CARD: S1, S2 normal; no murmurs, gallops, or rubs. Regular rate and rhythm.  RESP: Normal respiratory effort, no tachypnea or distress. Lungs CTAB, no wheezes, rales or rhonchi.  ABD: soft, NT/ND.  EXT: Normal ROM. No clubbing, cyanosis or edema.  MSK. Tenderness over the right flank/paraspinal region. Ecchymosis to BL paraspinal area in the region of approxiamtely T12  NEURO: Alert, oriented. Grossly unremarkable. No focal deficits.  PSYCH: Cooperative, appropriate.

## 2025-04-07 NOTE — ED PROVIDER NOTE - PROGRESS NOTE DETAILS
Authored by Dr. Landin: CT noted. Will admit for serial CBCs and coumadin reversal. bh: Considered FFP, pt consented, will monitor CBC, signed out to inpt team, pt aware.

## 2025-04-07 NOTE — ED PROVIDER NOTE - CLINICAL SUMMARY MEDICAL DECISION MAKING FREE TEXT BOX
This patient presents with back pain and elevated INR, CT with hematoma, Vit K given, conseted for blood products and considered FFP. Differential diagnoses includes lumbago versus musculoskeletal spasm / strain versus sciatica. No back pain red flags on history or physical. Presentation not consistent with malignancy (lack of history of malignancy, lack of B symptoms), fracture (no trauma, no bony tenderness to palpation), cauda equina (no bowel or urinary incontinence/retention, no saddle anesthesia, no distal weakness), AAA, viscus perforation, osteomyelitis or epidural abscess (no IVDU, vertebral tenderness), renal colic, pyelonephritis (afebrile, no CVAT, no urinary symptoms). Plan to admit for further evaluation and management.

## 2025-04-07 NOTE — ED PROVIDER NOTE - ATTENDING CONTRIBUTION TO CARE
I have reviewed and agree with the mid-level note, except as documented in my note below.    64-year-old female history of SLE on hydroxychloroquine, DVT/PE on Coumadin, CHF, now presents with left mid back pain for approximately 1 week, slight relief with acetaminophen and cyclobenzaprine, also reports hematuria and elevated INR of 8 (stop Coumadin 4 days ago), sx are constant, worse with certain movements and position, somewhat better with rest, denies fever, tactile temp, chills, paresthesias, focal weakness, abdominal or chest pain, bowel or bladder dysfunction, urinary sx, LE weakness, saddle anesthesia, or other associated complaints at present. Pt denies recent heavy lifting or trauma. No old chart available for review. I have reviewed and agree with the initial nursing note, except as documented in my note.    VSS, awake, alert, chest CTAB, +S1/S2, RRR, abdomen soft, NT, no pulsatile masses or bruits appreciated, no midline spinal tenderness, step-offs or deformities, no erythema, swelling or ecchymosis, no skin rash or lesions, able to dorsiflex b/l great toe, no foot drop, motor and sensation intact b/l LE and equal, NV intact, antalgic gait.

## 2025-04-07 NOTE — ED ADULT TRIAGE NOTE - INTERNATIONAL TRAVEL
The patient is here for health maintenance visit.  Currently, the patient consumes a unhealthy diet and has an adequate exercise regimen.  Screening lab work is ordered.  Immunizations were reviewed today.  Advice and education was given regarding nutrition, aerobic exercise, routine dental evaluations, routine eye exams, reproductive health, cardiovascular risk reduction, sunscreen use, self skin examination (annual dermatology evaluations) and seatbelt use (general overall safety).  Further recommendations will be given if needed after lab evaluation.  Annual wellness evaluation is recommended.     No

## 2025-04-08 LAB
ALBUMIN SERPL ELPH-MCNC: 3.5 G/DL — SIGNIFICANT CHANGE UP (ref 3.5–5.2)
ALP SERPL-CCNC: 49 U/L — SIGNIFICANT CHANGE UP (ref 30–115)
ALT FLD-CCNC: 28 U/L — SIGNIFICANT CHANGE UP (ref 0–41)
ANION GAP SERPL CALC-SCNC: 10 MMOL/L — SIGNIFICANT CHANGE UP (ref 7–14)
APTT BLD: 109.9 SEC — CRITICAL HIGH (ref 27–39.2)
APTT BLD: 29.2 SEC — SIGNIFICANT CHANGE UP (ref 27–39.2)
APTT BLD: 38.3 SEC — SIGNIFICANT CHANGE UP (ref 27–39.2)
AST SERPL-CCNC: 53 U/L — HIGH (ref 0–41)
BASOPHILS # BLD AUTO: 0.03 K/UL — SIGNIFICANT CHANGE UP (ref 0–0.2)
BASOPHILS NFR BLD AUTO: 0.4 % — SIGNIFICANT CHANGE UP (ref 0–1)
BILIRUB SERPL-MCNC: 0.7 MG/DL — SIGNIFICANT CHANGE UP (ref 0.2–1.2)
BUN SERPL-MCNC: 15 MG/DL — SIGNIFICANT CHANGE UP (ref 10–20)
CALCIUM SERPL-MCNC: 8.3 MG/DL — LOW (ref 8.4–10.5)
CHLORIDE SERPL-SCNC: 99 MMOL/L — SIGNIFICANT CHANGE UP (ref 98–110)
CO2 SERPL-SCNC: 25 MMOL/L — SIGNIFICANT CHANGE UP (ref 17–32)
CREAT SERPL-MCNC: 0.7 MG/DL — SIGNIFICANT CHANGE UP (ref 0.7–1.5)
EGFR: 97 ML/MIN/1.73M2 — SIGNIFICANT CHANGE UP
EGFR: 97 ML/MIN/1.73M2 — SIGNIFICANT CHANGE UP
EOSINOPHIL # BLD AUTO: 0.02 K/UL — SIGNIFICANT CHANGE UP (ref 0–0.7)
EOSINOPHIL NFR BLD AUTO: 0.3 % — SIGNIFICANT CHANGE UP (ref 0–8)
GLUCOSE SERPL-MCNC: 110 MG/DL — HIGH (ref 70–99)
HCT VFR BLD CALC: 23.3 % — LOW (ref 37–47)
HCT VFR BLD CALC: 24.1 % — LOW (ref 37–47)
HCT VFR BLD CALC: 24.8 % — LOW (ref 37–47)
HGB BLD-MCNC: 7.9 G/DL — LOW (ref 12–16)
HGB BLD-MCNC: 8.3 G/DL — LOW (ref 12–16)
HGB BLD-MCNC: 8.5 G/DL — LOW (ref 12–16)
IMM GRANULOCYTES NFR BLD AUTO: 0.6 % — HIGH (ref 0.1–0.3)
INR BLD: 1.35 RATIO — HIGH (ref 0.65–1.3)
INR BLD: 2.46 RATIO — HIGH (ref 0.65–1.3)
LYMPHOCYTES # BLD AUTO: 0.72 K/UL — LOW (ref 1.2–3.4)
LYMPHOCYTES # BLD AUTO: 9.9 % — LOW (ref 20.5–51.1)
MCHC RBC-ENTMCNC: 29.6 PG — SIGNIFICANT CHANGE UP (ref 27–31)
MCHC RBC-ENTMCNC: 29.9 PG — SIGNIFICANT CHANGE UP (ref 27–31)
MCHC RBC-ENTMCNC: 29.9 PG — SIGNIFICANT CHANGE UP (ref 27–31)
MCHC RBC-ENTMCNC: 33.9 G/DL — SIGNIFICANT CHANGE UP (ref 32–37)
MCHC RBC-ENTMCNC: 34.3 G/DL — SIGNIFICANT CHANGE UP (ref 32–37)
MCHC RBC-ENTMCNC: 34.4 G/DL — SIGNIFICANT CHANGE UP (ref 32–37)
MCV RBC AUTO: 86.7 FL — SIGNIFICANT CHANGE UP (ref 81–99)
MCV RBC AUTO: 87.3 FL — SIGNIFICANT CHANGE UP (ref 81–99)
MCV RBC AUTO: 87.3 FL — SIGNIFICANT CHANGE UP (ref 81–99)
MONOCYTES # BLD AUTO: 0.67 K/UL — HIGH (ref 0.1–0.6)
MONOCYTES NFR BLD AUTO: 9.2 % — SIGNIFICANT CHANGE UP (ref 1.7–9.3)
NEUTROPHILS # BLD AUTO: 5.79 K/UL — SIGNIFICANT CHANGE UP (ref 1.4–6.5)
NEUTROPHILS NFR BLD AUTO: 79.6 % — HIGH (ref 42.2–75.2)
NRBC BLD AUTO-RTO: 0 /100 WBCS — SIGNIFICANT CHANGE UP (ref 0–0)
PLATELET # BLD AUTO: 241 K/UL — SIGNIFICANT CHANGE UP (ref 130–400)
PLATELET # BLD AUTO: 242 K/UL — SIGNIFICANT CHANGE UP (ref 130–400)
PLATELET # BLD AUTO: 264 K/UL — SIGNIFICANT CHANGE UP (ref 130–400)
PMV BLD: 11.8 FL — HIGH (ref 7.4–10.4)
PMV BLD: 12 FL — HIGH (ref 7.4–10.4)
PMV BLD: 12.3 FL — HIGH (ref 7.4–10.4)
POTASSIUM SERPL-MCNC: 4.3 MMOL/L — SIGNIFICANT CHANGE UP (ref 3.5–5)
POTASSIUM SERPL-SCNC: 4.3 MMOL/L — SIGNIFICANT CHANGE UP (ref 3.5–5)
PROT SERPL-MCNC: 6.6 G/DL — SIGNIFICANT CHANGE UP (ref 6–8)
PROTHROM AB SERPL-ACNC: 16 SEC — HIGH (ref 9.95–12.87)
PROTHROM AB SERPL-ACNC: 29.6 SEC — HIGH (ref 9.95–12.87)
RBC # BLD: 2.67 M/UL — LOW (ref 4.2–5.4)
RBC # BLD: 2.78 M/UL — LOW (ref 4.2–5.4)
RBC # BLD: 2.84 M/UL — LOW (ref 4.2–5.4)
RBC # FLD: 13.1 % — SIGNIFICANT CHANGE UP (ref 11.5–14.5)
RBC # FLD: 13.2 % — SIGNIFICANT CHANGE UP (ref 11.5–14.5)
RBC # FLD: 13.2 % — SIGNIFICANT CHANGE UP (ref 11.5–14.5)
SODIUM SERPL-SCNC: 134 MMOL/L — LOW (ref 135–146)
WBC # BLD: 7.08 K/UL — SIGNIFICANT CHANGE UP (ref 4.8–10.8)
WBC # BLD: 7.24 K/UL — SIGNIFICANT CHANGE UP (ref 4.8–10.8)
WBC # BLD: 7.27 K/UL — SIGNIFICANT CHANGE UP (ref 4.8–10.8)
WBC # FLD AUTO: 7.08 K/UL — SIGNIFICANT CHANGE UP (ref 4.8–10.8)
WBC # FLD AUTO: 7.24 K/UL — SIGNIFICANT CHANGE UP (ref 4.8–10.8)
WBC # FLD AUTO: 7.27 K/UL — SIGNIFICANT CHANGE UP (ref 4.8–10.8)

## 2025-04-08 PROCEDURE — 99223 1ST HOSP IP/OBS HIGH 75: CPT

## 2025-04-08 PROCEDURE — 99233 SBSQ HOSP IP/OBS HIGH 50: CPT

## 2025-04-08 RX ORDER — HEPARIN SODIUM 1000 [USP'U]/ML
3000 INJECTION INTRAVENOUS; SUBCUTANEOUS EVERY 6 HOURS
Refills: 0 | Status: DISCONTINUED | OUTPATIENT
Start: 2025-04-08 | End: 2025-04-12

## 2025-04-08 RX ORDER — HEPARIN SODIUM 1000 [USP'U]/ML
INJECTION INTRAVENOUS; SUBCUTANEOUS
Qty: 25000 | Refills: 0 | Status: DISCONTINUED | OUTPATIENT
Start: 2025-04-08 | End: 2025-04-12

## 2025-04-08 RX ORDER — INFLUENZA A VIRUS A/IDAHO/07/2018 (H1N1) ANTIGEN (MDCK CELL DERIVED, PROPIOLACTONE INACTIVATED, INFLUENZA A VIRUS A/INDIANA/08/2018 (H3N2) ANTIGEN (MDCK CELL DERIVED, PROPIOLACTONE INACTIVATED), INFLUENZA B VIRUS B/SINGAPORE/INFTT-16-0610/2016 ANTIGEN (MDCK CELL DERIVED, PROPIOLACTONE INACTIVATED), INFLUENZA B VIRUS B/IOWA/06/2017 ANTIGEN (MDCK CELL DERIVED, PROPIOLACTONE INACTIVATED) 15; 15; 15; 15 UG/.5ML; UG/.5ML; UG/.5ML; UG/.5ML
0.5 INJECTION, SUSPENSION INTRAMUSCULAR ONCE
Refills: 0 | Status: DISCONTINUED | OUTPATIENT
Start: 2025-04-08 | End: 2025-04-16

## 2025-04-08 RX ORDER — HEPARIN SODIUM 1000 [USP'U]/ML
6000 INJECTION INTRAVENOUS; SUBCUTANEOUS EVERY 6 HOURS
Refills: 0 | Status: DISCONTINUED | OUTPATIENT
Start: 2025-04-08 | End: 2025-04-12

## 2025-04-08 RX ORDER — ACETAMINOPHEN 500 MG/5ML
650 LIQUID (ML) ORAL ONCE
Refills: 0 | Status: DISCONTINUED | OUTPATIENT
Start: 2025-04-08 | End: 2025-04-08

## 2025-04-08 RX ADMIN — Medication 40 MILLIGRAM(S): at 06:10

## 2025-04-08 RX ADMIN — HYDROXYCHLOROQUINE SULFATE 200 MILLIGRAM(S): 200 TABLET, FILM COATED ORAL at 17:24

## 2025-04-08 RX ADMIN — FLUOXETINE HYDROCHLORIDE 40 MILLIGRAM(S): 20 CAPSULE ORAL at 11:23

## 2025-04-08 RX ADMIN — HEPARIN SODIUM 1600 UNIT(S)/HR: 1000 INJECTION INTRAVENOUS; SUBCUTANEOUS at 16:15

## 2025-04-08 RX ADMIN — HEPARIN SODIUM 6000 UNIT(S): 1000 INJECTION INTRAVENOUS; SUBCUTANEOUS at 16:17

## 2025-04-08 RX ADMIN — CEFTRIAXONE 100 MILLIGRAM(S): 500 INJECTION, POWDER, FOR SOLUTION INTRAMUSCULAR; INTRAVENOUS at 00:15

## 2025-04-08 RX ADMIN — HEPARIN SODIUM 1300 UNIT(S)/HR: 1000 INJECTION INTRAVENOUS; SUBCUTANEOUS at 09:23

## 2025-04-08 RX ADMIN — Medication 1 MILLIGRAM(S): at 22:49

## 2025-04-08 RX ADMIN — HEPARIN SODIUM 1400 UNIT(S)/HR: 1000 INJECTION INTRAVENOUS; SUBCUTANEOUS at 23:53

## 2025-04-08 RX ADMIN — Medication 2 MILLIGRAM(S): at 01:56

## 2025-04-08 RX ADMIN — HYDROXYCHLOROQUINE SULFATE 200 MILLIGRAM(S): 200 TABLET, FILM COATED ORAL at 06:10

## 2025-04-08 RX ADMIN — HEPARIN SODIUM 1600 UNIT(S)/HR: 1000 INJECTION INTRAVENOUS; SUBCUTANEOUS at 18:59

## 2025-04-08 NOTE — PHARMACOTHERAPY INTERVENTION NOTE - COMMENTS
64yFemale    Indication: SLE/APS, DVT/PE 1990  INR Goal: 2-3   Home Dose: 7.5 mg daily per note  Bridge Therapy:heparin  Infusion.  Unit(s)/Hr <Continuous>      Current Medications:  cefTRIAXone   IVPB 1000 milliGRAM(s) IV Intermittent every 24 hours  FLUoxetine 40 milliGRAM(s) Oral daily  heparin   Injectable 6000 Unit(s) IV Push every 6 hours PRN  heparin   Injectable 3000 Unit(s) IV Push every 6 hours PRN  heparin  Infusion.  Unit(s)/Hr IV Continuous <Continuous>  hydroxychloroquine 200 milliGRAM(s) Oral two times a day  influenza   Vaccine 0.5 milliLiter(s) IntraMuscular once  lactated ringers. 1000 milliLiter(s) IV Continuous <Continuous>  morphine  - Injectable 2 milliGRAM(s) IV Push every 4 hours PRN  ondansetron Injectable 4 milliGRAM(s) IV Push every 8 hours PRN  pantoprazole    Tablet 40 milliGRAM(s) Oral before breakfast  warfarin 1 milliGRAM(s) Oral once      hemoglobin 7.9 g/dL (04-08-25 @ 15:15)    hematocrit 23.3 % (04-08-25 @ 15:15)    PLT: 242 K/uL (04-08-25 @ 15:15)    GFR:97 mL/min/1.73m2 (04-08-25 @ 05:51)      Drug Interactions: Ceftriaxone     INR trend  13.97 ratio (04-07-25 @ 16:13)  13.12 ratio (04-07-25 @ 20:28)  2.46 ratio (04-08-25 @ 00:28)  1.35 ratio (04-08-25 @ 05:51)      Warfarin administration history:        1. INR today is:               [ x   ] below goal ----- This is likely due to vitamin K 20 mg total received on 4/7                                            [   ] at goal                                            [   ] above goal ------ This is likely due to        2. Discuss with provider(s) listed                                            [     ] to consider  Warfarin           PO x 1                                            [     ] provider recommended warfarin                 PO x 1                                           [   x  ] provider ordered warfarin       1 mg              PO x 1     3. Obtain INR tomorrow AM     64yFemale    Indication: SLE/APS, DVT/PE 1990  INR Goal: 2-3   Home Dose: 7.5 mg daily per H&P  Bridge Therapy:heparin  Infusion.  Unit(s)/Hr <Continuous>      Current Medications:  cefTRIAXone   IVPB 1000 milliGRAM(s) IV Intermittent every 24 hours  FLUoxetine 40 milliGRAM(s) Oral daily  heparin   Injectable 6000 Unit(s) IV Push every 6 hours PRN  heparin   Injectable 3000 Unit(s) IV Push every 6 hours PRN  heparin  Infusion.  Unit(s)/Hr IV Continuous <Continuous>  hydroxychloroquine 200 milliGRAM(s) Oral two times a day  influenza   Vaccine 0.5 milliLiter(s) IntraMuscular once  lactated ringers. 1000 milliLiter(s) IV Continuous <Continuous>  morphine  - Injectable 2 milliGRAM(s) IV Push every 4 hours PRN  ondansetron Injectable 4 milliGRAM(s) IV Push every 8 hours PRN  pantoprazole    Tablet 40 milliGRAM(s) Oral before breakfast  warfarin 1 milliGRAM(s) Oral once      hemoglobin 7.9 g/dL (04-08-25 @ 15:15)    hematocrit 23.3 % (04-08-25 @ 15:15)    PLT: 242 K/uL (04-08-25 @ 15:15)    GFR:97 mL/min/1.73m2 (04-08-25 @ 05:51)      Drug Interactions: Ceftriaxone    INR trend  13.97 ratio (04-07-25 @ 16:13)  13.12 ratio (04-07-25 @ 20:28)  2.46 ratio (04-08-25 @ 00:28)  1.35 ratio (04-08-25 @ 05:51)      Warfarin administration history:        1. INR today is:               [ x   ] below goal ----- This is likely due to vitamin K 20 mg total received on 4/7                                            [   ] at goal                                            [   ] above goal ------ This is likely due to        2. Discuss with provider(s) listed                                            [     ] to consider  Warfarin           PO x 1                                            [     ] provider recommended warfarin                 PO x 1                                           [   x  ] provider ordered warfarin       1 mg              PO x 1     3. Obtain INR tomorrow AM

## 2025-04-08 NOTE — CONSULT NOTE ADULT - SUBJECTIVE AND OBJECTIVE BOX
GENERAL SURGERY CONSULT NOTE    Patient: HENRY SANDERS , 64y ([birth year])Female   MRN: [id number]  Location: [address]  Visit: 04-07-25 Inpatient  Date: 04-08-25 @ 10:34    HPI:  The patient is a 64 year old female with PMH APS, Lupus, DVT/PE on Coumadin, hx IVC filter since removed, HTN, Depression, osteoporosis presenting with back pain since March 30th (approx 9 days). Says pain has gotten worse, on right side of back, says she has had difficulty laying on right side of back and also difficulty walking 2/2 lighteheadedness/dizzines. She denies trauma to back. Last dose of Coumadin was last wdnesday 6 days ago but held since because INR was supra-therapeutic. She has taken tylenol (> 5000mg/day) and flexeril over this time which has only helped a little. She saw her PCP yesterday who sent her to ED after INR came back at 13. She endorses blood in her urine.  In ED patient afebrile and HDS. labs notable for hgb 9.4, INR 14. CT chest/abdomen/pelvis showed large right paraspinal intramuscular hematoma measuring [id number]. Admitted for management of her INR and back pain 2/2 hematoma.    Since admission patient received 20U vitamin K and watched closely. Her hgb is now 8.4 from 9.4, INR has since normalized. She has been on a heparin drip for prophylaxis. Surgery consulted for paraspinal intramuscular hematoma.       PAST MEDICAL & SURGICAL HISTORY:  Heart failure      H/O antiphospholipid syndrome      DVT, lower extremity      Hypertension      Pulmonary embolism          Home Medications:  alendronate 70 mg oral tablet: 1 tab(s) orally once a week (07 Apr 2025 21:40)  FLUoxetine 40 mg oral capsule: 1 cap(s) orally once a day (07 Apr 2025 21:40)  hydroxychloroquine 200 mg oral tablet: 1 tab(s) orally 2 times a day (07 Apr 2025 21:37)  NIFEdipine (Eqv-Procardia XL) 30 mg oral tablet, extended release: 1 tab(s) orally once a day (07 Apr 2025 21:40)  warfarin 7.5 mg oral tablet: 1 tab(s) orally once a day (07 Apr 2025 21:40)        VITALS:  T(F): 98.7 (04-08-25 @ 07:57), Max: 99.2 (04-08-25 @ 02:55)  HR: 82 (04-08-25 @ 07:57) (74 - 84)  BP: 118/74 (04-08-25 @ 07:57) (91/61 - 121/79)  RR: 17 (04-08-25 @ 07:57) (17 - 18)  SpO2: 100% (04-08-25 @ 07:57) (97% - 100%)    PHYSICAL EXAM:    General: well appearing, no acute distress  HEENT: pupils equal and reactive, EOM intact, mucous membranes moist, no scleral icterus  CV: RRR on radial exam  Pulm: breathing well on room air, no acute respiratory distress  Abdomen: soft, nontender, no rebound or guarding  Ext: well perfused, no peripheral edema, no ROM or strength deficits. 2+ radial, PT, and DP pulses   Neuro: alert and oriented x3, no focal sensory/motor deficits down the legs  Back: large right sided back hematoma with mild overlying ecchymosis, no evidence of skin breakdown/necrosis. Mildly tender to touch      MEDICATIONS  (STANDING):  cefTRIAXone   IVPB 1000 milliGRAM(s) IV Intermittent every 24 hours  FLUoxetine 40 milliGRAM(s) Oral daily  heparin  Infusion.  Unit(s)/Hr (13 mL/Hr) IV Continuous <Continuous>  hydroxychloroquine 200 milliGRAM(s) Oral two times a day  influenza   Vaccine 0.5 milliLiter(s) IntraMuscular once  lactated ringers. 1000 milliLiter(s) (75 mL/Hr) IV Continuous <Continuous>  pantoprazole    Tablet 40 milliGRAM(s) Oral before breakfast    MEDICATIONS  (PRN):  heparin   Injectable 6000 Unit(s) IV Push every 6 hours PRN For aPTT less than 40  heparin   Injectable 3000 Unit(s) IV Push every 6 hours PRN For aPTT between 40 - 57  morphine  - Injectable 2 milliGRAM(s) IV Push every 4 hours PRN Severe Pain (7 - 10)  ondansetron Injectable 4 milliGRAM(s) IV Push every 8 hours PRN Nausea and/or Vomiting      LAB/STUDIES:                        8.3    7.27  )-----------( 241      ( 08 Apr 2025 05:51 )             24.1     04-08    134[L]  |  99  |  15  ----------------------------<  110[H]  4.3   |  25  |  0.7    Ca    8.3[L]      08 Apr 2025 05:51  Phos  3.0     04-07  Mg     1.9     04-07    TPro  6.6  /  Alb  3.5  /  TBili  0.7  /  DBili  x   /  AST  53[H]  /  ALT  28  /  AlkPhos  49  04-08    PT/INR - ( 08 Apr 2025 05:51 )   PT: 16.00 sec;   INR: 1.35 ratio         PTT - ( 08 Apr 2025 05:51 )  PTT:29.2 sec  LIVER FUNCTIONS - ( 08 Apr 2025 05:51 )  Alb: 3.5 g/dL / Pro: 6.6 g/dL / ALK PHOS: 49 U/L / ALT: 28 U/L / AST: 53 U/L / GGT: x           Urinalysis Basic - ( 08 Apr 2025 05:51 )    Color: x / Appearance: x / SG: x / pH: x  Gluc: 110 mg/dL / Ketone: x  / Bili: x / Urobili: x   Blood: x / Protein: x / Nitrite: x   Leuk Esterase: x / RBC: x / WBC x   Sq Epi: x / Non Sq Epi: x / Bacteria: x                  Urinalysis with Rflx Culture (collected 07 Apr 2025 19:33)      IMAGING:  < from: CT Abdomen and Pelvis w/ IV Cont (04.07.25 @ 17:28) >    IMPRESSION:  1.  Right paraspinal intramuscular hematoma extending from the lower   thorax approximately T9 through approximately L4, difficult to quantitate   but expansile and measures approximately 7.1 x 5.4 x 25 cm.  2.  No evidence of acute intrathoracic or intra-abdominal pelvic   pathology.        --- End of Report ---            JASMINA JEWELL; Attending Radiologist  This document has been electronically signed. Apr 7 2025  5:59PM    < end of copied text >      ACCESS DEVICES:  [x ] Peripheral IV  [ ] Central Venous Line	[ ] R	[ ] L	[ ] IJ	[ ] Fem	[ ] SC	Placed:   [ ] Arterial Line		[ ] R	[ ] L	[ ] Fem	[ ] Rad	[ ] Ax	Placed:   [ ] PICC:					[ ] Mediport  [ ] Urinary Catheter, Date Placed:     ASSESSMENT:  64yF w/ PMHx notable for APS, Lupus, DVT/PE on coumadin presenting with 9 days back pain and supra-therapeutic INR found to have large right sided paraspinal intramuscular hematoma. Surgery consulted for eval.  At this time patient is stable, mild physical exam, hgb drop appears to have slowed, and INR has normalized. No surgical intervention indicated at this time    PLAN:  - no surgical intervention  - continue medical management per medicine/hematology for INR  - trend hgb and INR, transfuse as needed  - will monitor tonight, as long as she is stable can continue medical management and the hematoma will gradually resorb; otherwise if worsens can consider further imaging, possible IR intervention, and only surgical intervention if signs of skin breakdown      Discussed with surgery attending Dr. Quentin Harvey MD  PGY2 General Surgery  CONSULT SPECTRA: [id number]

## 2025-04-08 NOTE — CONSULT NOTE ADULT - ATTENDING COMMENTS
Trauma/Acute Care Surgery Attending Note Attestation  Patient was seen and examined bedside.  I reviewed the resident/PA note and agreed with above assessment and plan with following additions and corrections.    History as above.     T(C): 37.6 (04-08-25 @ 20:14), Max: 38.2 (04-08-25 @ 15:00)  HR: 87 (04-08-25 @ 15:00) (75 - 87)  BP: 106/69 (04-08-25 @ 15:00) (106/69 - 121/79)  RR: 17 (04-08-25 @ 07:57) (17 - 18)  SpO2: 100% (04-08-25 @ 07:57) (97% - 100%)      04-08-25 @ 07:01  -  04-08-25 @ 23:15  --------------------------------------------------------  IN:    Heparin Infusion: 142 mL    Oral Fluid: 360 mL  Total IN: 502 mL    OUT:  Total OUT: 0 mL    Total NET: 502 mL    I independently performed a medically appropriate exam. The exam was notable for scattered ecchymosis of the back. Mild swelling in lower right back without significant skin changes.                          7.9    7.08  )-----------( 242      ( 08 Apr 2025 15:15 )             23.3     04-08    134[L]  |  99  |  15  ----------------------------<  110[H]  4.3   |  25  |  0.7    Ca 8.3[L]; Mg x ; Phos x       ( 08 Apr 2025 05:51 )  Alb: 3.5 g/dL / Pro: 6.6 g/dL / AlkPhos: 49 U/L / ALT: 28 U/L / AST: 53 U/L / GGT:x     / Tbili 0.7 mg/dL/ Dbili x     / Indbili x        PT/INR/PTT - ( 08 Apr 2025 15:15 )   PT: x    ; INR: x    ; PTT 38.3 sec    Blood Gas Venous - Lactate: 1.1 mmol/L (04-07 @ 15:53)    CT A/P reviewed and interpreted by me: Right paraspinal intramuscular hematoma    Assessment/Plan:  64y Female PMH antiphospholipid syndrome, DVT/PE admitted with back pain. Found to have right paraspinal intramuscular hematoma. No overlying skin changes or concerning exam findings. No acute surgical intervention. Hematoma will resolve on its own assuming it does not continue to bleed. Please call back if there are concerning overlying skin changes. Trend hemoglobin for acute blood loss anemia. Anticoagulation parameters will need to be monitored closely to prevent supratherapeutic anticoagulation.     Dimitry Saavedra MD  Trauma/Acute Care Surgery/Surgical Critical Care Attending
The patient was seen. Agree with above.  63 yo female with h/o APLS w/ DVT/PE on Warfarin was admitted for back pain and found to have paraspinal hematoma due to supratherapeutic Warfarin. She was given VitK and INR has returned to normal.   Heparin was reinitiated. May add warfarin. Need bridging to therapeutic INR. May give Lovenox for bridging as out patient. Keep INR 2-3.   Recommend to f[facility]u at [facility] for close monitoring.

## 2025-04-08 NOTE — PROGRESS NOTE ADULT - ASSESSMENT
64-year-old female with a past medical history of SLE/ Antiphospholipid syndrome , DVT/PE on Coumadin (1990) , Hypertension ; Depression presented to the emergency department complaining of right lower back pain for 8 days.   She endorses that she started having right sided mid- lower back pain after she woke up 8 days back. Back pain was acute in onset; aggravated on movement. Initially did not take any medications for it. She went to the urgent care and started taking tylenol and Muscle Relaxant( Cyclobenzaprine) but didnot help. 4 days ago she checked her INR and found to be 8 so stopped taking her Warfarin. INR on 03/30 was 13.2. Does not believe that she had any kind of trauma.   She also started having mery hematuria. Does not mention any burning urination ; increased frequency or incontinence.     #Hematoma  #Supratherapeutic INR   - f/u surgery eval  - monitor for signs of bleeding  - f/u INR everyday   - heparin drip with coumadin     #Supratherapeutic INR INR now 1.0 , restart coumadin , heparin until therapeutic     # PE/DVT On Coumadin:   # SLE   # Antiphospholipid Syndrome   # Anemia from blood loss  - c/w heparin drip and coumadin     # Pyuria/ Cystitis  with Hematuria secondary to acute cystitis   - c/w rocephin   - pending cultures     #Hyponatremia  - monitor     # Hypertension   - BP: 118/74 (08 Apr 2025 07:57) (91/61 - 121/79)  - controlled   - no bp med for now     # Misc  - DVT Prophylaxis: heparin   Injectable  heparin   Injectable  heparin  Infusion.  - GI Prophylaxis: pantoprazole    Tablet 40 milliGRAM(s) Oral before breakfast  - Diet: Diet, DASH/TLC  - Activity: Activity - Bedrest  - Code Status: Full

## 2025-04-08 NOTE — PATIENT PROFILE ADULT - FALL HARM RISK - RISK INTERVENTIONS

## 2025-04-08 NOTE — CONSULT NOTE ADULT - CONSULT REASON
APA w/ history of massive PE, bleed due to INR 14,  please advise on when to restart AC. APA w/ history of massive PE, bleed due to INR 14, please advise on when to restart AC.

## 2025-04-08 NOTE — PATIENT PROFILE ADULT - NSPROPTRIGHTCAREGIVER_GEN_A_NUR
GENERAL PRE-PROCEDURE:     Written consent obtained?: Yes    Risks and benefits: Risks, benefits and alternatives were discussed    Consent given by:  Patient  Patient states understanding of procedure being performed: Yes    Patient's understanding of procedure matches consent: Yes    Procedure consent matches procedure scheduled: Yes    Expected level of sedation:  Moderate  Appropriately NPO:  Yes  ASA Class:  Class 2- mild systemic disease, no acute problems, no functional limitations  Mallampati  :  Grade 3- soft palate visible, posterior pharyngeal wall not visible  Lungs:  Lungs clear with good breath sounds bilaterally  Heart:  Normal heart sounds and rate  History & Physical reviewed:  History and physical reviewed and no updates needed  Statement of review:  I have reviewed the lab findings, diagnostic data, medications, and the plan for sedation    
no

## 2025-04-08 NOTE — PROGRESS NOTE ADULT - ASSESSMENT
64-year-old female with a past medical history of SLE/ Antiphospholipid syndrome , DVT/PE on Coumadin (1990) , Hypertension ; Depression presented to the emergency department complaining of right lower back pain for 8 days.   She endorses that she started having right sided mid- lower back pain after she woke up 8 days back. Back pain was acute in onset; aggravated on movement. Initially did not take any medications for it. She went to the urgent care and started taking tylenol and Muscle Relaxant( Cyclobenzaprine) but didnot help. 4 days ago she checked her INR and found to be 8 so stopped taking her Warfarin. INR on 03/30 was 13.2. Does not believe that she had any kind of trauma.   She also started having mery hematuria. Does not mention any burning urination ; increased frequency or incontinence.     #Hematoma- surgery eval , spoke with consultant service , monitor , likely secondary to elevated INR     #Supratherapeutic INR INR now 1.0 , restart coumadin , heparin until therapeutic     #Drug monitoring of high risk medication , potential for drug drug reaction , requiring close monitoring PT/INR - ( 08 Apr 2025 05:51 )   PT: 16.00 sec;   INR: 1.35 ratio   PTT - ( 08 Apr 2025 05:51 )  PTT:29.2 sec, continue with heparin and coumadin , monitor for signs of bleeding for further blood loss     # PE/DVT On Coumadin:     # SLE     # Antiphospholipid Syndrome :     # Anemia from blood loss    # Pyuria/ Cystitis  with Hematuria secondary to acute cystitis   rocephin   pending cultures     #Hyponatremia no intervention     # Hypertension   BP: 118/74 (08 Apr 2025 07:57) (91/61 - 121/79)  controlled   no bp med for now     PROGRESS NOTE HANDOFF    Pending:  surgery eval , reintroducing coumadin , monitor for signs of bleeding     Family discussion: patient verbalized understanding and agreeable to plan of care     Disposition: Home

## 2025-04-08 NOTE — CONSULT NOTE ADULT - ASSESSMENT
Ms. Garcia is a 65 yo F with a PMH of APLS w/ DVT/PE diagnosed in 1990 on Warfarin presenting for back pain x8d found to have paraspinal hematoma with supratherapeutic INR due to Coumadin use.     Assessment  Ms. Pereira is a 65 yo F with a PMH of APLS w/ DVT/PE diagnosed in 1990 on Warfarin presenting for back pain x8d found to have paraspinal hematoma with supratherapeutic INR due to Coumadin use.    #Supratherapeutic INR c/b R Paraspinal Hematoma, possibly Secondary to Interaction between Warfarin and Acetaminophen  #Hx of APLS and DVT/PE on Warfarin Dx in 1990    - Patient endorses R lower back pain x8d and has been taking >5g of acetaminophen for multiple days  -    Recommendations     Assessment  Ms. Pereira is a 65 yo F with a PMH of APLS w/ DVT/PE diagnosed in 1990 on Warfarin presenting for back pain x8d found to have paraspinal hematoma with supratherapeutic INR due to Coumadin use.    #Supratherapeutic INR c/b R Paraspinal Hematoma, possibly Secondary to Interaction between Warfarin and Acetaminophen  #Hx of APLS and DVT/PE on Warfarin Dx in 1990  - 4 days ago INR 8, did not use warfarin since that time  - Patient endorses R lower back pain x8d and has been taking >5g of acetaminophen for multiple days  - INR on arrival 13.97, now 1.35 s/p Vit K IV 10mg x1 and Vit K PO 10mg x1   - Acetaminophen is known to cause dose-dependent increase in INR for patients on Warfarin, especially at consistent elevated doses >2g per day    Recommendations  - Monitor HgB daily, transfuse if HgB <7    ***This note is incomplete until discussion with attending***   Assessment  Ms. Pereira is a 65 yo F with a PMH of APLS w/ DVT/PE diagnosed in 1990 on Warfarin presenting for back pain x8d found to have supratherapeutic INR c/b paraspinal hematoma.    #Supratherapeutic INR c/b R Paraspinal Hematoma, possibly Secondary to Interaction between Warfarin and Acetaminophen  #Acute Blood Loss Anemia  #Hematuria  #Hx of APLS and DVT/PE on Warfarin Dx in 1990  - Began experiencing R lower back pain ~9 days ago, went to urgent care and given Tylenol and Cyclobenzaprine  - Checked INR at home 5 days ago resulted at 8, held Warfarin since that time  - Endorses taking 3-5g of acetaminophen for multiple days prior to arrival  - INR on arrival 13.97, now 1.35 s/p Vit K IV 10mg x1 and Vit K PO 10mg x1  - CTAP w/ IV Contrast: Right paraspinal intramuscular hematoma extending from the lower thorax approximately T9 through approximately L4, difficult to quantitate but expansile and measures approximately 7.1 x 5.4 x 25cm  - Acetaminophen is known to cause dose-dependent increase in INR for patients on Warfarin, especially at consistent elevated doses >2g per day    Recommendations ***NOT OFFICIAL UNTIL DISCUSSED WITH ATTENDING***  - Continue with Heparin infusion for AC  - If no procedure planned, can re-introduce Wafarin with Heparin bridge for therapeutic INR 2-3  - Avoid Tylenol use if possible, at minimum ensure total daily dose <1.5g and for limited course length  - Monitor HgB daily, transfuse if HgB <7       Assessment  Ms. Pereira is a 63 yo F with a PMH of APLS w/ DVT/PE diagnosed in 1990 on Warfarin presenting for back pain x8d found to have supratherapeutic INR c/b paraspinal hematoma.    #Supratherapeutic INR c/b R Paraspinal Hematoma, possibly Secondary to Interaction between Warfarin and Acetaminophen  #Acute Blood Loss Anemia  #Hematuria  #Hx of APLS and DVT/PE on Warfarin Dx in 1990  - Began experiencing R lower back pain ~9 days ago, went to urgent care and given Tylenol and Cyclobenzaprine  - Checked INR at home 5 days ago resulted at 8, held Warfarin since that time  - Endorses taking 3-5g of acetaminophen for multiple days prior to arrival  - INR on arrival 13.97, now 1.35 s/p Vit K IV 10mg x1 and Vit K PO 10mg x1  - CTAP w/ IV Contrast: Right paraspinal intramuscular hematoma extending from the lower thorax approximately T9 through approximately L4, difficult to quantitate but expansile and measures approximately 7.1 x 5.4 x 25cm  - Acetaminophen is known to cause dose-dependent increase in INR for patients on Warfarin, especially at consistent elevated doses >2g per day    Recommendations  - Monitor CBC and INR Closely, transfuse if HgB <7, goal INR 2-3  - Continue with Heparin AC and resume Warfarin; coordinate with Warfarin pharmacist for dosing  - Patient should have a total of 72hr overlap of Heparin infusion and Warfarin with therapeutic INR. If patient is to be discharged during this time frame, can complete 3 days with therapeutic Lovenox 1mg/kg BID along with Warfarin  - Patient can follow up with our Coumadin clinic here or with her team in [place]; if she is to return to her previous team must be informed that dosing needs adjustment in the outpatient setting  - Avoid Tylenol use if possible, at minimum ensure total daily dose <1.5g and for limited course length

## 2025-04-08 NOTE — CONSULT NOTE ADULT - SUBJECTIVE AND OBJECTIVE BOX
Hematology/Oncology Consult Note    HPI:  64-year-old female with a past medical history of SLE/ Antiphospholipid syndrome , DVT/PE on Coumadin (1990) , Hypertension ; Depression presented to the emergency department complaining of right lower back pain for 8 days.   She endorses that she started having right sided mid- lower back pain after she woke up 8 days back. Back pain was acute in onset; aggravated on movement. Initially did not take any medications for it. She went to the urgent care and started taking tylenol and Muscle Relaxant( Cyclobenzaprine) but didnot help. 4 days ago she checked her INR and found to be 8 so stopped taking her Warfarin. INR on 03/30 was 13.2. Does not believe that she had any kind of trauma.   She also started having mery hematuria. Does not mention any burning urination ; increased frequency or incontinence.     Has not changed her dietary habits. Was eating mandarin oranges.   No new medications was added.     Was diagnosed with SLE/APLS in 1990s. Was Diagnosed with DVT/PE 1990 ; started taking Coumadin , Mentions had DVT initially and started COumadin but had a PE on Coumadin.   Hematologist : Dr. Mota ( [facility]) and Rheumatologist Dr. Whitt ( [facility]).     In the ED ;   Vitals : BP 91/61 ; HR 76 ; RR 18 ; Afebrile ; Saturating 98% on RA.     Labs:   WBC 6.90 Hb 9.3 MCV 86.7 Platelets 254 k   PT/INR : >40/13.97 ; aptt 78.1   Na 131 K 4.0   BUN/Creatinine 18/0.7     VBG : pH 7.41 Co2 40 HCO3 25 Lactate 1.1     UA positive     Imaging:   Chest Xray: Right basilar mild atelectasis. No acute infiltrate. No pneumothorax.    CT Chest/Abdomen/Pelvis:   1. Right paraspinal intramuscular hematoma extending from the lower thorax approximately T9 through approximately L4, difficult to quantitate but expansile and measures approximately 7.1 x 5.4 x 25 cm.  2. No evidence of acute intrathoracic or intra-abdominal pelvic pathology.    s/p 500 cc Fluid bolus ; Oral Vitamin K.     Admitted to medicine.    (07 Apr 2025 20:18)      Oncology Hx:      Allergies    penicillin (Unknown)    Intolerances        MEDICATIONS  (STANDING):  cefTRIAXone   IVPB 1000 milliGRAM(s) IV Intermittent every 24 hours  FLUoxetine 40 milliGRAM(s) Oral daily  heparin  Infusion.  Unit(s)/Hr (13 mL/Hr) IV Continuous <Continuous>  hydroxychloroquine 200 milliGRAM(s) Oral two times a day  influenza   Vaccine 0.5 milliLiter(s) IntraMuscular once  lactated ringers. 1000 milliLiter(s) (75 mL/Hr) IV Continuous <Continuous>  pantoprazole    Tablet 40 milliGRAM(s) Oral before breakfast    MEDICATIONS  (PRN):  heparin   Injectable 6000 Unit(s) IV Push every 6 hours PRN For aPTT less than 40  heparin   Injectable 3000 Unit(s) IV Push every 6 hours PRN For aPTT between 40 - 57  morphine  - Injectable 2 milliGRAM(s) IV Push every 4 hours PRN Severe Pain (7 - 10)  ondansetron Injectable 4 milliGRAM(s) IV Push every 8 hours PRN Nausea and/or Vomiting      PAST MEDICAL & SURGICAL HISTORY:  Heart failure      H/O antiphospholipid syndrome      DVT, lower extremity      Hypertension      Pulmonary embolism          FAMILY HISTORY:      SOCIAL HISTORY: No EtOH, no tobacco    REVIEW OF SYSTEMS:    CONSTITUTIONAL: No weakness, fevers or chills  EYES/ENT: No visual changes;  No vertigo or throat pain   NECK: No pain or stiffness  RESPIRATORY: No cough, wheezing, hemoptysis; No shortness of breath  CARDIOVASCULAR: No chest pain or palpitations  GASTROINTESTINAL: No abdominal or epigastric pain. No nausea, vomiting, or hematemesis; No diarrhea or constipation. No melena or hematochezia.  GENITOURINARY: No dysuria, frequency or hematuria  NEUROLOGICAL: No numbness or weakness  SKIN: No itching, burning, rashes, or lesions   All other review of systems is negative unless indicated above.      Weight (kg): 72.6 (04-07 @ 13:30)    T(F): 98.7 (04-08-25 @ 07:57), Max: 99.2 (04-08-25 @ 02:55)  HR: 82 (04-08-25 @ 07:57)  BP: 118/74 (04-08-25 @ 07:57)  RR: 17 (04-08-25 @ 07:57)  SpO2: 100% (04-08-25 @ 07:57)  Wt(kg): --    GENERAL: NAD, well-developed  HEAD:  Atraumatic, Normocephalic  EYES: EOMI, PERRLA, conjunctiva and sclera clear  NECK: Supple, No JVD  CHEST/LUNG: Clear to auscultation bilaterally; No wheeze  HEART: Regular rate and rhythm; No murmurs, rubs, or gallops  ABDOMEN: Soft, Nontender, Nondistended; Bowel sounds present  EXTREMITIES:  2+ Peripheral Pulses, No clubbing, cyanosis, or edema  NEUROLOGY: non-focal  SKIN: No rashes or lesions                          8.3    7.27  )-----------( 241      ( 08 Apr 2025 05:51 )             24.1       04-08    134[L]  |  99  |  15  ----------------------------<  110[H]  4.3   |  25  |  0.7    Ca    8.3[L]      08 Apr 2025 05:51  Phos  3.0     04-07  Mg     1.9     04-07    TPro  6.6  /  Alb  3.5  /  TBili  0.7  /  DBili  x   /  AST  53[H]  /  ALT  28  /  AlkPhos  49  04-08      Magnesium: 1.9 mg/dL (04-07 @ 16:13)  Phosphorus: 3.0 mg/dL (04-07 @ 16:13)       Hematology/Oncology Consult Note    Admission HPI:  64-year-old female with a past medical history of SLE/ Antiphospholipid syndrome , DVT/PE on Coumadin (1990) , Hypertension ; Depression presented to the emergency department complaining of right lower back pain for 8 days.   She endorses that she started having right sided mid- lower back pain after she woke up 8 days back. Back pain was acute in onset; aggravated on movement. Initially did not take any medications for it. She went to the urgent care and started taking tylenol and Muscle Relaxant( Cyclobenzaprine) but didnot help. 4 days ago she checked her INR and found to be 8 so stopped taking her Warfarin. INR on 03/30 was 13.2. Does not believe that she had any kind of trauma.   She also started having mery hematuria. Does not mention any burning urination ; increased frequency or incontinence.     Has not changed her dietary habits. Was eating mandarin oranges.   No new medications was added.     Was diagnosed with SLE/APLS in 1990s. Was Diagnosed with DVT/PE 1990 ; started taking Coumadin , Mentions had DVT initially and started COumadin but had a PE on Coumadin.   Hematologist : Dr. Mota ( [facility]) and Rheumatologist Dr. Whitt ( [facility]).     In the ED ;   Vitals : BP 91/61 ; HR 76 ; RR 18 ; Afebrile ; Saturating 98% on RA.     Labs:   WBC 6.90 Hb 9.3 MCV 86.7 Platelets 254 k   PT/INR : >40/13.97 ; aptt 78.1   Na 131 K 4.0   BUN/Creatinine 18/0.7     VBG : pH 7.41 Co2 40 HCO3 25 Lactate 1.1     UA positive     Imaging:   Chest Xray: Right basilar mild atelectasis. No acute infiltrate. No pneumothorax.    CT Chest/Abdomen/Pelvis:   1. Right paraspinal intramuscular hematoma extending from the lower thorax approximately T9 through approximately L4, difficult to quantitate but expansile and measures approximately 7.1 x 5.4 x 25 cm.  2. No evidence of acute intrathoracic or intra-abdominal pelvic pathology.    s/p 500 cc Fluid bolus ; Oral Vitamin K.     Admitted to medicine.    (07 Apr 2025 20:18)      Oncology Hx:      Allergies    penicillin (Unknown)    Intolerances        MEDICATIONS  (STANDING):  cefTRIAXone   IVPB 1000 milliGRAM(s) IV Intermittent every 24 hours  FLUoxetine 40 milliGRAM(s) Oral daily  heparin  Infusion.  Unit(s)/Hr (13 mL/Hr) IV Continuous <Continuous>  hydroxychloroquine 200 milliGRAM(s) Oral two times a day  influenza   Vaccine 0.5 milliLiter(s) IntraMuscular once  lactated ringers. 1000 milliLiter(s) (75 mL/Hr) IV Continuous <Continuous>  pantoprazole    Tablet 40 milliGRAM(s) Oral before breakfast    MEDICATIONS  (PRN):  heparin   Injectable 6000 Unit(s) IV Push every 6 hours PRN For aPTT less than 40  heparin   Injectable 3000 Unit(s) IV Push every 6 hours PRN For aPTT between 40 - 57  morphine  - Injectable 2 milliGRAM(s) IV Push every 4 hours PRN Severe Pain (7 - 10)  ondansetron Injectable 4 milliGRAM(s) IV Push every 8 hours PRN Nausea and/or Vomiting      PAST MEDICAL & SURGICAL HISTORY:  Heart failure      H/O antiphospholipid syndrome      DVT, lower extremity      Hypertension      Pulmonary embolism          FAMILY HISTORY:      SOCIAL HISTORY:    REVIEW OF SYSTEMS:    CONSTITUTIONAL: Endorses weakness on standing  EYES/ENT: Endorses lightheadedness on standing  NECK: No pain or stiffness  RESPIRATORY: No cough, wheezing, hemoptysis; No shortness of breath  CARDIOVASCULAR: No chest pain or palpitations  GASTROINTESTINAL: No abdominal or epigastric pain. No nausea, vomiting, or hematemesis; No diarrhea or constipation. No melena or hematochezia.  GENITOURINARY: Endorses R lower back pain and hematuria  NEUROLOGICAL: No numbness or weakness  SKIN: No itching, burning, rashes, or lesions   All other review of systems is negative unless indicated above.      Weight (kg): 72.6 (04-07 @ 13:30)    T(F): 98.7 (04-08-25 @ 07:57), Max: 99.2 (04-08-25 @ 02:55)  HR: 82 (04-08-25 @ 07:57)  BP: 118/74 (04-08-25 @ 07:57)  RR: 17 (04-08-25 @ 07:57)  SpO2: 100% (04-08-25 @ 07:57)  Wt(kg): --    GENERAL: NAD, well-developed  HEAD:  NCAT  EYES: EOMI, PERRLA, conjunctiva and sclera clear  NECK: No JVD  CHEST/LUNG: Regular inspiratory effort, no accessory muscle use  HEART: Regular rate, rhythm  ABDOMEN: Soft, Nontender, Nondistended. R Flank tenderness at hematoma site  EXTREMITIES:  2+ Peripheral Pulses, No clubbing, cyanosis, or edema  NEUROLOGY: Non-focal, limited by pain  SKIN: No rashes or lesions                          8.3    7.27  )-----------( 241      ( 08 Apr 2025 05:51 )             24.1       04-08    134[L]  |  99  |  15  ----------------------------<  110[H]  4.3   |  25  |  0.7    Ca    8.3[L]      08 Apr 2025 05:51  Phos  3.0     04-07  Mg     1.9     04-07    TPro  6.6  /  Alb  3.5  /  TBili  0.7  /  DBili  x   /  AST  53[H]  /  ALT  28  /  AlkPhos  49  04-08      Magnesium: 1.9 mg/dL (04-07 @ 16:13)  Phosphorus: 3.0 mg/dL (04-07 @ 16:13)       Hematology/Oncology Consult Note    Admission HPI:  64-year-old female with a past medical history of SLE/ Antiphospholipid syndrome , DVT/PE on Coumadin (1990) , Hypertension ; Depression presented to the emergency department complaining of right lower back pain for 8 days. She endorses that she started having right sided mid-lower back pain after she woke up 8 days back. Back pain was acute in onset; aggravated on movement. Initially did not take any medications for it. She went to the urgent care and started taking tylenol and Muscle Relaxant( Cyclobenzaprine) but did not help. 4 days ago she checked her INR and found to be 8 so stopped taking her Warfarin. INR on 03/30 was 13.2. Does not believe that she had any kind of trauma. She also started having mery hematuria. Does not mention any burning urination ; increased frequency or incontinence. Has not changed her dietary habits. Was eating mandarin oranges. No new medications was added.     Was diagnosed with SLE/APLS in 1990s. Was Diagnosed with DVT/PE 1990 ; started taking Coumadin , Mentions had DVT initially and started COumadin but had a PE on Coumadin. Hematologist : Dr. Mota ([place]) and Rheumatologist Dr. Whitt ([place])    In the ED ;   Vitals : BP 91/61 ; HR 76 ; RR 18 ; Afebrile ; Saturating 98% on RA.     Labs:   WBC 6.90 Hb 9.3 MCV 86.7 Platelets 254 k   PT/INR : >40/13.97 ; aptt 78.1   Na 131 K 4.0   BUN/Creatinine 18/0.7     VBG : pH 7.41 Co2 40 HCO3 25 Lactate 1.1     UA positive     Imaging:   Chest Xray: Right basilar mild atelectasis. No acute infiltrate. No pneumothorax.    CT Chest/Abdomen/Pelvis:   1. Right paraspinal intramuscular hematoma extending from the lower thorax approximately T9 through approximately L4, difficult to quantitate but expansile and measures approximately 7.1 x 5.4 x 25 cm.  2. No evidence of acute intrathoracic or intra-abdominal pelvic pathology.    s/p 500 cc Fluid bolus ; Oral Vitamin K.     Admitted to medicine.    (07 Apr 2025 20:18)      Oncology Hx:      Allergies    penicillin (Unknown)    Intolerances        MEDICATIONS  (STANDING):  cefTRIAXone   IVPB 1000 milliGRAM(s) IV Intermittent every 24 hours  FLUoxetine 40 milliGRAM(s) Oral daily  heparin  Infusion.  Unit(s)/Hr (13 mL/Hr) IV Continuous <Continuous>  hydroxychloroquine 200 milliGRAM(s) Oral two times a day  influenza   Vaccine 0.5 milliLiter(s) IntraMuscular once  lactated ringers. 1000 milliLiter(s) (75 mL/Hr) IV Continuous <Continuous>  pantoprazole    Tablet 40 milliGRAM(s) Oral before breakfast    MEDICATIONS  (PRN):  heparin   Injectable 6000 Unit(s) IV Push every 6 hours PRN For aPTT less than 40  heparin   Injectable 3000 Unit(s) IV Push every 6 hours PRN For aPTT between 40 - 57  morphine  - Injectable 2 milliGRAM(s) IV Push every 4 hours PRN Severe Pain (7 - 10)  ondansetron Injectable 4 milliGRAM(s) IV Push every 8 hours PRN Nausea and/or Vomiting      PAST MEDICAL & SURGICAL HISTORY:  Heart failure      H/O antiphospholipid syndrome      DVT, lower extremity      Hypertension      Pulmonary embolism          FAMILY HISTORY:      SOCIAL HISTORY:    REVIEW OF SYSTEMS:    CONSTITUTIONAL: Endorses weakness on standing  EYES/ENT: Endorses lightheadedness on standing  NECK: No pain or stiffness  RESPIRATORY: No cough, wheezing, hemoptysis; No shortness of breath  CARDIOVASCULAR: No chest pain or palpitations  GASTROINTESTINAL: No abdominal or epigastric pain. No nausea, vomiting, or hematemesis; No diarrhea or constipation. No melena or hematochezia.  GENITOURINARY: Endorses R lower back pain and hematuria  NEUROLOGICAL: No numbness or weakness  SKIN: No itching, burning, rashes, or lesions   All other review of systems is negative unless indicated above.      Weight (kg): 72.6 (04-07 @ 13:30)    T(F): 98.7 (04-08-25 @ 07:57), Max: 99.2 (04-08-25 @ 02:55)  HR: 82 (04-08-25 @ 07:57)  BP: 118/74 (04-08-25 @ 07:57)  RR: 17 (04-08-25 @ 07:57)  SpO2: 100% (04-08-25 @ 07:57)  Wt(kg): --    GENERAL: NAD, well-developed  HEAD:  NCAT  EYES: EOMI, PERRLA, conjunctiva and sclera clear  NECK: No JVD  CHEST/LUNG: Regular inspiratory effort, no accessory muscle use  HEART: Regular rate, rhythm  ABDOMEN: Soft, Nontender, Nondistended. R Flank tenderness at hematoma site  EXTREMITIES:  2+ Peripheral Pulses, No clubbing, cyanosis, or edema  NEUROLOGY: Non-focal, limited by pain  SKIN: No rashes or lesions                          8.3    7.27  )-----------( 241      ( 08 Apr 2025 05:51 )             24.1       04-08    134[L]  |  99  |  15  ----------------------------<  110[H]  4.3   |  25  |  0.7    Ca    8.3[L]      08 Apr 2025 05:51  Phos  3.0     04-07  Mg     1.9     04-07    TPro  6.6  /  Alb  3.5  /  TBili  0.7  /  DBili  x   /  AST  53[H]  /  ALT  28  /  AlkPhos  49  04-08      Magnesium: 1.9 mg/dL (04-07 @ 16:13)  Phosphorus: 3.0 mg/dL (04-07 @ 16:13)       Hematology/Oncology Consult Note    ***NOT OFFICIAL UNTIL DISCUSSED WITH ATTENDING***    Admission HPI:  64-year-old female with a past medical history of SLE/ Antiphospholipid syndrome , DVT/PE on Coumadin (1990) , Hypertension ; Depression presented to the emergency department complaining of right lower back pain for 8 days. She endorses that she started having right sided mid-lower back pain after she woke up 8 days back. Back pain was acute in onset; aggravated on movement. Initially did not take any medications for it. She went to the urgent care and started taking tylenol and Muscle Relaxant( Cyclobenzaprine) but did not help. 4 days ago she checked her INR and found to be 8 so stopped taking her Warfarin. INR on 03/30 was 13.2. Does not believe that she had any kind of trauma. She also started having mery hematuria. Does not mention any burning urination ; increased frequency or incontinence. Has not changed her dietary habits. Was eating mandarin oranges. No new medications was added.     Was diagnosed with SLE/APLS in 1990s. Was Diagnosed with DVT/PE 1990 ; started taking Coumadin , Mentions had DVT initially and started COumadin but had a PE on Coumadin. Hematologist : Dr. Mota ([place]) and Rheumatologist Dr. Whitt ([place])    In the ED ;   Vitals : BP 91/61 ; HR 76 ; RR 18 ; Afebrile ; Saturating 98% on RA.     Labs:   WBC 6.90 Hb 9.3 MCV 86.7 Platelets 254 k   PT/INR : >40/13.97 ; aptt 78.1   Na 131 K 4.0   BUN/Creatinine 18/0.7     VBG : pH 7.41 Co2 40 HCO3 25 Lactate 1.1     UA positive     Imaging:   Chest Xray: Right basilar mild atelectasis. No acute infiltrate. No pneumothorax.    CT Chest/Abdomen/Pelvis:   1. Right paraspinal intramuscular hematoma extending from the lower thorax approximately T9 through approximately L4, difficult to quantitate but expansile and measures approximately 7.1 x 5.4 x 25 cm.  2. No evidence of acute intrathoracic or intra-abdominal pelvic pathology.    s/p 500 cc Fluid bolus ; Oral Vitamin K.     Admitted to medicine.    (07 Apr 2025 20:18)      Oncology Hx:      Allergies    penicillin (Unknown)    Intolerances        MEDICATIONS  (STANDING):  cefTRIAXone   IVPB 1000 milliGRAM(s) IV Intermittent every 24 hours  FLUoxetine 40 milliGRAM(s) Oral daily  heparin  Infusion.  Unit(s)/Hr (13 mL/Hr) IV Continuous <Continuous>  hydroxychloroquine 200 milliGRAM(s) Oral two times a day  influenza   Vaccine 0.5 milliLiter(s) IntraMuscular once  lactated ringers. 1000 milliLiter(s) (75 mL/Hr) IV Continuous <Continuous>  pantoprazole    Tablet 40 milliGRAM(s) Oral before breakfast    MEDICATIONS  (PRN):  heparin   Injectable 6000 Unit(s) IV Push every 6 hours PRN For aPTT less than 40  heparin   Injectable 3000 Unit(s) IV Push every 6 hours PRN For aPTT between 40 - 57  morphine  - Injectable 2 milliGRAM(s) IV Push every 4 hours PRN Severe Pain (7 - 10)  ondansetron Injectable 4 milliGRAM(s) IV Push every 8 hours PRN Nausea and/or Vomiting      PAST MEDICAL & SURGICAL HISTORY:  Heart failure      H/O antiphospholipid syndrome      DVT, lower extremity      Hypertension      Pulmonary embolism          FAMILY HISTORY:      SOCIAL HISTORY:    REVIEW OF SYSTEMS:    CONSTITUTIONAL: Endorses weakness on standing  EYES/ENT: Endorses lightheadedness on standing  NECK: No pain or stiffness  RESPIRATORY: No cough, wheezing, hemoptysis; No shortness of breath  CARDIOVASCULAR: No chest pain or palpitations  GASTROINTESTINAL: No abdominal or epigastric pain. No nausea, vomiting, or hematemesis; No diarrhea or constipation. No melena or hematochezia.  GENITOURINARY: Endorses R lower back pain and hematuria  NEUROLOGICAL: No numbness or weakness  SKIN: No itching, burning, rashes, or lesions   All other review of systems is negative unless indicated above.      Weight (kg): 72.6 (04-07 @ 13:30)    T(F): 98.7 (04-08-25 @ 07:57), Max: 99.2 (04-08-25 @ 02:55)  HR: 82 (04-08-25 @ 07:57)  BP: 118/74 (04-08-25 @ 07:57)  RR: 17 (04-08-25 @ 07:57)  SpO2: 100% (04-08-25 @ 07:57)  Wt(kg): --    GENERAL: NAD, well-developed  HEAD:  NCAT  EYES: EOMI, PERRLA, conjunctiva and sclera clear  NECK: No JVD  CHEST/LUNG: Regular inspiratory effort, no accessory muscle use  HEART: Regular rate, rhythm  ABDOMEN: Soft, Nontender, Nondistended. R Flank tenderness at hematoma site  EXTREMITIES:  2+ Peripheral Pulses, No clubbing, cyanosis, or edema  NEUROLOGY: Non-focal, limited by pain  SKIN: No rashes or lesions                          8.3    7.27  )-----------( 241      ( 08 Apr 2025 05:51 )             24.1       04-08    134[L]  |  99  |  15  ----------------------------<  110[H]  4.3   |  25  |  0.7    Ca    8.3[L]      08 Apr 2025 05:51  Phos  3.0     04-07  Mg     1.9     04-07    TPro  6.6  /  Alb  3.5  /  TBili  0.7  /  DBili  x   /  AST  53[H]  /  ALT  28  /  AlkPhos  49  04-08      Magnesium: 1.9 mg/dL (04-07 @ 16:13)  Phosphorus: 3.0 mg/dL (04-07 @ 16:13)       Hematology/Oncology Consult Note    Admission HPI:  64-year-old female with a past medical history of SLE/ Antiphospholipid syndrome , DVT/PE on Coumadin (1990) , Hypertension ; Depression presented to the emergency department complaining of right lower back pain for 8 days. She endorses that she started having right sided mid-lower back pain after she woke up 8 days back. Back pain was acute in onset; aggravated on movement. Initially did not take any medications for it. She went to the urgent care and started taking tylenol and Muscle Relaxant( Cyclobenzaprine) but did not help. 4 days ago she checked her INR and found to be 8 so stopped taking her Warfarin. INR on 03/30 was 13.2. Does not believe that she had any kind of trauma. She also started having mery hematuria. Does not mention any burning urination ; increased frequency or incontinence. Has not changed her dietary habits. Was eating mandarin oranges. No new medications was added.     Was diagnosed with SLE/APLS in 1990s. Was Diagnosed with DVT/PE 1990 ; started taking Coumadin , Mentions had DVT initially and started COumadin but had a PE on Coumadin. Hematologist : Dr. Mota ([place]) and Rheumatologist Dr. Whitt ([place])    In the ED ;   Vitals : BP 91/61 ; HR 76 ; RR 18 ; Afebrile ; Saturating 98% on RA.     Labs:   WBC 6.90 Hb 9.3 MCV 86.7 Platelets 254 k   PT/INR : >40/13.97 ; aptt 78.1   Na 131 K 4.0   BUN/Creatinine 18/0.7     VBG : pH 7.41 Co2 40 HCO3 25 Lactate 1.1     UA positive     Imaging:   Chest Xray: Right basilar mild atelectasis. No acute infiltrate. No pneumothorax.    CT Chest/Abdomen/Pelvis:   1. Right paraspinal intramuscular hematoma extending from the lower thorax approximately T9 through approximately L4, difficult to quantitate but expansile and measures approximately 7.1 x 5.4 x 25 cm.  2. No evidence of acute intrathoracic or intra-abdominal pelvic pathology.    s/p 500 cc Fluid bolus ; Oral Vitamin K.     Admitted to medicine.    (07 Apr 2025 20:18)      Oncology Hx:      Allergies    penicillin (Unknown)    Intolerances        MEDICATIONS  (STANDING):  cefTRIAXone   IVPB 1000 milliGRAM(s) IV Intermittent every 24 hours  FLUoxetine 40 milliGRAM(s) Oral daily  heparin  Infusion.  Unit(s)/Hr (13 mL/Hr) IV Continuous <Continuous>  hydroxychloroquine 200 milliGRAM(s) Oral two times a day  influenza   Vaccine 0.5 milliLiter(s) IntraMuscular once  lactated ringers. 1000 milliLiter(s) (75 mL/Hr) IV Continuous <Continuous>  pantoprazole    Tablet 40 milliGRAM(s) Oral before breakfast    MEDICATIONS  (PRN):  heparin   Injectable 6000 Unit(s) IV Push every 6 hours PRN For aPTT less than 40  heparin   Injectable 3000 Unit(s) IV Push every 6 hours PRN For aPTT between 40 - 57  morphine  - Injectable 2 milliGRAM(s) IV Push every 4 hours PRN Severe Pain (7 - 10)  ondansetron Injectable 4 milliGRAM(s) IV Push every 8 hours PRN Nausea and/or Vomiting      PAST MEDICAL & SURGICAL HISTORY:  Heart failure      H/O antiphospholipid syndrome      DVT, lower extremity      Hypertension      Pulmonary embolism          FAMILY HISTORY:      SOCIAL HISTORY:    REVIEW OF SYSTEMS:    CONSTITUTIONAL: Endorses weakness on standing  EYES/ENT: Endorses lightheadedness on standing  NECK: No pain or stiffness  RESPIRATORY: No cough, wheezing, hemoptysis; No shortness of breath  CARDIOVASCULAR: No chest pain or palpitations  GASTROINTESTINAL: No abdominal or epigastric pain. No nausea, vomiting, or hematemesis; No diarrhea or constipation. No melena or hematochezia.  GENITOURINARY: Endorses R lower back pain and hematuria  NEUROLOGICAL: No numbness or weakness  SKIN: No itching, burning, rashes, or lesions   All other review of systems is negative unless indicated above.      Weight (kg): 72.6 (04-07 @ 13:30)    T(F): 98.7 (04-08-25 @ 07:57), Max: 99.2 (04-08-25 @ 02:55)  HR: 82 (04-08-25 @ 07:57)  BP: 118/74 (04-08-25 @ 07:57)  RR: 17 (04-08-25 @ 07:57)  SpO2: 100% (04-08-25 @ 07:57)  Wt(kg): --    GENERAL: NAD, well-developed  HEAD:  NCAT  EYES: EOMI, PERRLA, conjunctiva and sclera clear  NECK: No JVD  CHEST/LUNG: Regular inspiratory effort, no accessory muscle use  HEART: Regular rate, rhythm  ABDOMEN: Soft, Nontender, Nondistended. R Flank tenderness at hematoma site  EXTREMITIES:  2+ Peripheral Pulses, No clubbing, cyanosis, or edema  NEUROLOGY: Non-focal, limited by pain  SKIN: No rashes or lesions                          8.3    7.27  )-----------( 241      ( 08 Apr 2025 05:51 )             24.1       04-08    134[L]  |  99  |  15  ----------------------------<  110[H]  4.3   |  25  |  0.7    Ca    8.3[L]      08 Apr 2025 05:51  Phos  3.0     04-07  Mg     1.9     04-07    TPro  6.6  /  Alb  3.5  /  TBili  0.7  /  DBili  x   /  AST  53[H]  /  ALT  28  /  AlkPhos  49  04-08      Magnesium: 1.9 mg/dL (04-07 @ 16:13)  Phosphorus: 3.0 mg/dL (04-07 @ 16:13)       Hematology/Oncology Consult Note    Admission HPI:  64-year-old female with a past medical history of SLE/ Antiphospholipid syndrome , DVT/PE on Coumadin (1990) , Hypertension ; Depression presented to the emergency department complaining of right lower back pain for 8 days. She endorses that she started having right sided mid-lower back pain after she woke up 8 days back. Back pain was acute in onset; aggravated on movement. Initially did not take any medications for it. She went to the urgent care and started taking tylenol and Muscle Relaxant( Cyclobenzaprine) but did not help. 5 days ago she checked her INR and found to be 8 so stopped taking her Warfarin. Does not believe that she had any kind of trauma. She also started having mery hematuria. Does not mention any burning urination ; increased frequency or incontinence. Has not changed her dietary habits. Was eating mandarin oranges. No new medications was added.     Was diagnosed with SLE/APLS in 1990s. Was Diagnosed with DVT/PE 1990 ; started taking Coumadin , Mentions had DVT initially and started COumadin but had a PE on Coumadin. Hematologist : Dr. Mota ([place]) and Rheumatologist Dr. Whitt ([place])    In the ED ;   Vitals : BP 91/61 ; HR 76 ; RR 18 ; Afebrile ; Saturating 98% on RA.     Labs:   WBC 6.90 Hb 9.3 MCV 86.7 Platelets 254 k   PT/INR : >40/13.97 ; aptt 78.1   Na 131 K 4.0   BUN/Creatinine 18/0.7     VBG : pH 7.41 Co2 40 HCO3 25 Lactate 1.1     UA positive     Imaging:   Chest Xray: Right basilar mild atelectasis. No acute infiltrate. No pneumothorax.    CT Chest/Abdomen/Pelvis:   1. Right paraspinal intramuscular hematoma extending from the lower thorax approximately T9 through approximately L4, difficult to quantitate but expansile and measures approximately 7.1 x 5.4 x 25 cm.  2. No evidence of acute intrathoracic or intra-abdominal pelvic pathology.    s/p 500 cc Fluid bolus ; Oral Vitamin K.     Admitted to medicine.    (07 Apr 2025 20:18)      Oncology Hx:      Allergies    penicillin (Unknown)    Intolerances        MEDICATIONS  (STANDING):  cefTRIAXone   IVPB 1000 milliGRAM(s) IV Intermittent every 24 hours  FLUoxetine 40 milliGRAM(s) Oral daily  heparin  Infusion.  Unit(s)/Hr (13 mL/Hr) IV Continuous <Continuous>  hydroxychloroquine 200 milliGRAM(s) Oral two times a day  influenza   Vaccine 0.5 milliLiter(s) IntraMuscular once  lactated ringers. 1000 milliLiter(s) (75 mL/Hr) IV Continuous <Continuous>  pantoprazole    Tablet 40 milliGRAM(s) Oral before breakfast    MEDICATIONS  (PRN):  heparin   Injectable 6000 Unit(s) IV Push every 6 hours PRN For aPTT less than 40  heparin   Injectable 3000 Unit(s) IV Push every 6 hours PRN For aPTT between 40 - 57  morphine  - Injectable 2 milliGRAM(s) IV Push every 4 hours PRN Severe Pain (7 - 10)  ondansetron Injectable 4 milliGRAM(s) IV Push every 8 hours PRN Nausea and/or Vomiting      PAST MEDICAL & SURGICAL HISTORY:  Heart failure      H/O antiphospholipid syndrome      DVT, lower extremity      Hypertension      Pulmonary embolism          FAMILY HISTORY:      SOCIAL HISTORY:    REVIEW OF SYSTEMS:    CONSTITUTIONAL: Endorses weakness on standing  EYES/ENT: Endorses lightheadedness on standing  NECK: No pain or stiffness  RESPIRATORY: No cough, wheezing, hemoptysis; No shortness of breath  CARDIOVASCULAR: No chest pain or palpitations  GASTROINTESTINAL: No abdominal or epigastric pain. No nausea, vomiting, or hematemesis; No diarrhea or constipation. No melena or hematochezia.  GENITOURINARY: Endorses R lower back pain and hematuria  NEUROLOGICAL: No numbness or weakness  SKIN: No itching, burning, rashes, or lesions   All other review of systems is negative unless indicated above.      Weight (kg): 72.6 (04-07 @ 13:30)    T(F): 98.7 (04-08-25 @ 07:57), Max: 99.2 (04-08-25 @ 02:55)  HR: 82 (04-08-25 @ 07:57)  BP: 118/74 (04-08-25 @ 07:57)  RR: 17 (04-08-25 @ 07:57)  SpO2: 100% (04-08-25 @ 07:57)  Wt(kg): --    GENERAL: NAD, well-developed  HEAD:  NCAT  EYES: EOMI, PERRLA, conjunctiva and sclera clear  NECK: No JVD  CHEST/LUNG: Regular inspiratory effort, no accessory muscle use  HEART: Regular rate, rhythm  ABDOMEN: Soft, Nontender, Nondistended. R Flank tenderness at hematoma site  EXTREMITIES:  2+ Peripheral Pulses, No clubbing, cyanosis, or edema  NEUROLOGY: Non-focal, limited by pain  SKIN: No rashes or lesions                          8.3    7.27  )-----------( 241      ( 08 Apr 2025 05:51 )             24.1       04-08    134[L]  |  99  |  15  ----------------------------<  110[H]  4.3   |  25  |  0.7    Ca    8.3[L]      08 Apr 2025 05:51  Phos  3.0     04-07  Mg     1.9     04-07    TPro  6.6  /  Alb  3.5  /  TBili  0.7  /  DBili  x   /  AST  53[H]  /  ALT  28  /  AlkPhos  49  04-08      Magnesium: 1.9 mg/dL (04-07 @ 16:13)  Phosphorus: 3.0 mg/dL (04-07 @ 16:13)

## 2025-04-09 LAB
ANION GAP SERPL CALC-SCNC: 12 MMOL/L — SIGNIFICANT CHANGE UP (ref 7–14)
ANION GAP SERPL CALC-SCNC: 12 MMOL/L — SIGNIFICANT CHANGE UP (ref 7–14)
APTT BLD: 48.2 SEC — HIGH (ref 27–39.2)
APTT BLD: 49.9 SEC — HIGH (ref 27–39.2)
APTT BLD: 62.5 SEC — HIGH (ref 27–39.2)
BASOPHILS # BLD AUTO: 0.03 K/UL — SIGNIFICANT CHANGE UP (ref 0–0.2)
BASOPHILS # BLD AUTO: 0.04 K/UL — SIGNIFICANT CHANGE UP (ref 0–0.2)
BASOPHILS NFR BLD AUTO: 0.4 % — SIGNIFICANT CHANGE UP (ref 0–1)
BASOPHILS NFR BLD AUTO: 0.6 % — SIGNIFICANT CHANGE UP (ref 0–1)
BUN SERPL-MCNC: 15 MG/DL — SIGNIFICANT CHANGE UP (ref 10–20)
BUN SERPL-MCNC: 17 MG/DL — SIGNIFICANT CHANGE UP (ref 10–20)
CALCIUM SERPL-MCNC: 8.1 MG/DL — LOW (ref 8.4–10.5)
CALCIUM SERPL-MCNC: 8.3 MG/DL — LOW (ref 8.4–10.5)
CHLORIDE SERPL-SCNC: 100 MMOL/L — SIGNIFICANT CHANGE UP (ref 98–110)
CHLORIDE SERPL-SCNC: 104 MMOL/L — SIGNIFICANT CHANGE UP (ref 98–110)
CO2 SERPL-SCNC: 21 MMOL/L — SIGNIFICANT CHANGE UP (ref 17–32)
CO2 SERPL-SCNC: 23 MMOL/L — SIGNIFICANT CHANGE UP (ref 17–32)
CREAT SERPL-MCNC: 0.7 MG/DL — SIGNIFICANT CHANGE UP (ref 0.7–1.5)
CREAT SERPL-MCNC: 0.8 MG/DL — SIGNIFICANT CHANGE UP (ref 0.7–1.5)
EGFR: 82 ML/MIN/1.73M2 — SIGNIFICANT CHANGE UP
EGFR: 82 ML/MIN/1.73M2 — SIGNIFICANT CHANGE UP
EGFR: 97 ML/MIN/1.73M2 — SIGNIFICANT CHANGE UP
EGFR: 97 ML/MIN/1.73M2 — SIGNIFICANT CHANGE UP
EOSINOPHIL # BLD AUTO: 0.04 K/UL — SIGNIFICANT CHANGE UP (ref 0–0.7)
EOSINOPHIL # BLD AUTO: 0.06 K/UL — SIGNIFICANT CHANGE UP (ref 0–0.7)
EOSINOPHIL NFR BLD AUTO: 0.6 % — SIGNIFICANT CHANGE UP (ref 0–8)
EOSINOPHIL NFR BLD AUTO: 0.9 % — SIGNIFICANT CHANGE UP (ref 0–8)
GLUCOSE BLDC GLUCOMTR-MCNC: 115 MG/DL — HIGH (ref 70–99)
GLUCOSE SERPL-MCNC: 104 MG/DL — HIGH (ref 70–99)
GLUCOSE SERPL-MCNC: 110 MG/DL — HIGH (ref 70–99)
HCT VFR BLD CALC: 20.6 % — LOW (ref 37–47)
HCT VFR BLD CALC: 21.8 % — LOW (ref 37–47)
HCT VFR BLD CALC: 21.9 % — LOW (ref 37–47)
HGB BLD-MCNC: 7 G/DL — LOW (ref 12–16)
HGB BLD-MCNC: 7.4 G/DL — LOW (ref 12–16)
HGB BLD-MCNC: 7.5 G/DL — LOW (ref 12–16)
IMM GRANULOCYTES NFR BLD AUTO: 0.5 % — HIGH (ref 0.1–0.3)
IMM GRANULOCYTES NFR BLD AUTO: 0.7 % — HIGH (ref 0.1–0.3)
INR BLD: 1.13 RATIO — SIGNIFICANT CHANGE UP (ref 0.65–1.3)
LYMPHOCYTES # BLD AUTO: 0.76 K/UL — LOW (ref 1.2–3.4)
LYMPHOCYTES # BLD AUTO: 0.8 K/UL — LOW (ref 1.2–3.4)
LYMPHOCYTES # BLD AUTO: 11.8 % — LOW (ref 20.5–51.1)
LYMPHOCYTES # BLD AUTO: 12.3 % — LOW (ref 20.5–51.1)
MAGNESIUM SERPL-MCNC: 1.8 MG/DL — SIGNIFICANT CHANGE UP (ref 1.8–2.4)
MAGNESIUM SERPL-MCNC: 2.1 MG/DL — SIGNIFICANT CHANGE UP (ref 1.8–2.4)
MCHC RBC-ENTMCNC: 29.9 PG — SIGNIFICANT CHANGE UP (ref 27–31)
MCHC RBC-ENTMCNC: 30 PG — SIGNIFICANT CHANGE UP (ref 27–31)
MCHC RBC-ENTMCNC: 30.2 PG — SIGNIFICANT CHANGE UP (ref 27–31)
MCHC RBC-ENTMCNC: 33.9 G/DL — SIGNIFICANT CHANGE UP (ref 32–37)
MCHC RBC-ENTMCNC: 34 G/DL — SIGNIFICANT CHANGE UP (ref 32–37)
MCHC RBC-ENTMCNC: 34.2 G/DL — SIGNIFICANT CHANGE UP (ref 32–37)
MCV RBC AUTO: 88 FL — SIGNIFICANT CHANGE UP (ref 81–99)
MCV RBC AUTO: 88.3 FL — SIGNIFICANT CHANGE UP (ref 81–99)
MCV RBC AUTO: 88.3 FL — SIGNIFICANT CHANGE UP (ref 81–99)
MONOCYTES # BLD AUTO: 0.69 K/UL — HIGH (ref 0.1–0.6)
MONOCYTES # BLD AUTO: 0.75 K/UL — HIGH (ref 0.1–0.6)
MONOCYTES NFR BLD AUTO: 10.2 % — HIGH (ref 1.7–9.3)
MONOCYTES NFR BLD AUTO: 12.2 % — HIGH (ref 1.7–9.3)
NEUTROPHILS # BLD AUTO: 4.55 K/UL — SIGNIFICANT CHANGE UP (ref 1.4–6.5)
NEUTROPHILS # BLD AUTO: 5.16 K/UL — SIGNIFICANT CHANGE UP (ref 1.4–6.5)
NEUTROPHILS NFR BLD AUTO: 73.8 % — SIGNIFICANT CHANGE UP (ref 42.2–75.2)
NEUTROPHILS NFR BLD AUTO: 76 % — HIGH (ref 42.2–75.2)
NRBC BLD AUTO-RTO: 0 /100 WBCS — SIGNIFICANT CHANGE UP (ref 0–0)
PHOSPHATE SERPL-MCNC: 3.1 MG/DL — SIGNIFICANT CHANGE UP (ref 2.1–4.9)
PLATELET # BLD AUTO: 222 K/UL — SIGNIFICANT CHANGE UP (ref 130–400)
PLATELET # BLD AUTO: 235 K/UL — SIGNIFICANT CHANGE UP (ref 130–400)
PLATELET # BLD AUTO: 252 K/UL — SIGNIFICANT CHANGE UP (ref 130–400)
PMV BLD: 11.8 FL — HIGH (ref 7.4–10.4)
PMV BLD: 12.6 FL — HIGH (ref 7.4–10.4)
PMV BLD: 12.7 FL — HIGH (ref 7.4–10.4)
POTASSIUM SERPL-MCNC: 3.3 MMOL/L — LOW (ref 3.5–5)
POTASSIUM SERPL-MCNC: 3.8 MMOL/L — SIGNIFICANT CHANGE UP (ref 3.5–5)
POTASSIUM SERPL-SCNC: 3.3 MMOL/L — LOW (ref 3.5–5)
POTASSIUM SERPL-SCNC: 3.8 MMOL/L — SIGNIFICANT CHANGE UP (ref 3.5–5)
PROTHROM AB SERPL-ACNC: 13.4 SEC — HIGH (ref 9.95–12.87)
RBC # BLD: 2.34 M/UL — LOW (ref 4.2–5.4)
RBC # BLD: 2.47 M/UL — LOW (ref 4.2–5.4)
RBC # BLD: 2.48 M/UL — LOW (ref 4.2–5.4)
RBC # FLD: 13.1 % — SIGNIFICANT CHANGE UP (ref 11.5–14.5)
RBC # FLD: 13.2 % — SIGNIFICANT CHANGE UP (ref 11.5–14.5)
RBC # FLD: 13.2 % — SIGNIFICANT CHANGE UP (ref 11.5–14.5)
SODIUM SERPL-SCNC: 135 MMOL/L — SIGNIFICANT CHANGE UP (ref 135–146)
SODIUM SERPL-SCNC: 137 MMOL/L — SIGNIFICANT CHANGE UP (ref 135–146)
WBC # BLD: 6.13 K/UL — SIGNIFICANT CHANGE UP (ref 4.8–10.8)
WBC # BLD: 6.17 K/UL — SIGNIFICANT CHANGE UP (ref 4.8–10.8)
WBC # BLD: 6.79 K/UL — SIGNIFICANT CHANGE UP (ref 4.8–10.8)
WBC # FLD AUTO: 6.13 K/UL — SIGNIFICANT CHANGE UP (ref 4.8–10.8)
WBC # FLD AUTO: 6.17 K/UL — SIGNIFICANT CHANGE UP (ref 4.8–10.8)
WBC # FLD AUTO: 6.79 K/UL — SIGNIFICANT CHANGE UP (ref 4.8–10.8)

## 2025-04-09 PROCEDURE — 99231 SBSQ HOSP IP/OBS SF/LOW 25: CPT

## 2025-04-09 PROCEDURE — 99233 SBSQ HOSP IP/OBS HIGH 50: CPT

## 2025-04-09 PROCEDURE — 93010 ELECTROCARDIOGRAM REPORT: CPT | Mod: 76

## 2025-04-09 PROCEDURE — 70450 CT HEAD/BRAIN W/O DYE: CPT | Mod: 26

## 2025-04-09 RX ADMIN — HEPARIN SODIUM 1400 UNIT(S)/HR: 1000 INJECTION INTRAVENOUS; SUBCUTANEOUS at 03:16

## 2025-04-09 RX ADMIN — CEFTRIAXONE 100 MILLIGRAM(S): 500 INJECTION, POWDER, FOR SOLUTION INTRAMUSCULAR; INTRAVENOUS at 23:33

## 2025-04-09 RX ADMIN — Medication 40 MILLIGRAM(S): at 06:29

## 2025-04-09 RX ADMIN — HEPARIN SODIUM 1400 UNIT(S)/HR: 1000 INJECTION INTRAVENOUS; SUBCUTANEOUS at 07:30

## 2025-04-09 RX ADMIN — HEPARIN SODIUM 1600 UNIT(S)/HR: 1000 INJECTION INTRAVENOUS; SUBCUTANEOUS at 19:29

## 2025-04-09 RX ADMIN — HEPARIN SODIUM 1400 UNIT(S)/HR: 1000 INJECTION INTRAVENOUS; SUBCUTANEOUS at 06:42

## 2025-04-09 RX ADMIN — HEPARIN SODIUM 3000 UNIT(S): 1000 INJECTION INTRAVENOUS; SUBCUTANEOUS at 21:28

## 2025-04-09 RX ADMIN — FLUOXETINE HYDROCHLORIDE 40 MILLIGRAM(S): 20 CAPSULE ORAL at 12:44

## 2025-04-09 RX ADMIN — HYDROXYCHLOROQUINE SULFATE 200 MILLIGRAM(S): 200 TABLET, FILM COATED ORAL at 18:40

## 2025-04-09 RX ADMIN — HEPARIN SODIUM 1600 UNIT(S)/HR: 1000 INJECTION INTRAVENOUS; SUBCUTANEOUS at 20:26

## 2025-04-09 RX ADMIN — CEFTRIAXONE 100 MILLIGRAM(S): 500 INJECTION, POWDER, FOR SOLUTION INTRAMUSCULAR; INTRAVENOUS at 00:24

## 2025-04-09 RX ADMIN — HEPARIN SODIUM 1800 UNIT(S)/HR: 1000 INJECTION INTRAVENOUS; SUBCUTANEOUS at 21:22

## 2025-04-09 RX ADMIN — Medication 1 APPLICATION(S): at 12:44

## 2025-04-09 RX ADMIN — Medication 2 MILLIGRAM(S): at 13:23

## 2025-04-09 RX ADMIN — Medication 2 MILLIGRAM(S): at 12:45

## 2025-04-09 RX ADMIN — HYDROXYCHLOROQUINE SULFATE 200 MILLIGRAM(S): 200 TABLET, FILM COATED ORAL at 06:30

## 2025-04-09 RX ADMIN — HEPARIN SODIUM 1600 UNIT(S)/HR: 1000 INJECTION INTRAVENOUS; SUBCUTANEOUS at 14:16

## 2025-04-09 NOTE — PROVIDER CONTACT NOTE (FALL NOTIFICATION) - ASSESSMENT
Two doctors from surgical team were assessing the other patient in the room while they heard a loud noise in the bathroom. Upon arrival, patient was sitting on toilet seat with the  holding her up. PT was unsteady and was assisted to the ground by MDs to prevent fall. Head trauma unknown at this time. RN immediately notified and arrived, assessed the patient and called a rapid response. Patient was alert, no loss of consciousness.

## 2025-04-09 NOTE — PROVIDER CONTACT NOTE (FALL NOTIFICATION) - BACKGROUND
Patient had been educated to ring call bell when in need of assistance to get out of bed. Fall bracelet and red socks in place. Bed alarm active.

## 2025-04-09 NOTE — PROVIDER CONTACT NOTE (FALL NOTIFICATION) - ACTION/TREATMENT ORDERED:
Rapid response activated. Vital signs taken. EKG&CT head performed. ERT @ bedside. Patient on zoll monitor. Fall education reinforced to patient and  at bedside after CT. CT neg-

## 2025-04-09 NOTE — PROGRESS NOTE ADULT - ASSESSMENT
64-year-old female with a past medical history of SLE/ Antiphospholipid syndrome , DVT/PE on Coumadin (1990) , Hypertension ; Depression presented to the emergency department complaining of right lower back pain for 8 days.   She endorses that she started having right sided mid- lower back pain after she woke up 8 days back. Back pain was acute in onset; aggravated on movement. Initially did not take any medications for it. She went to the urgent care and started taking tylenol and Muscle Relaxant( Cyclobenzaprine) but did not help. 4 days ago she checked her INR and found to be 8 so stopped taking her Warfarin. INR on 03/30 was 13.2. Does not believe that she had any kind of trauma.   She also started having mery hematuria. Does not mention any burning urination ; increased frequency or incontinence.     #Hematoma  #Supratherapeutic INR   - f/u surgery eval: no acute intervention since no overlying skin breakdown, will resorb overtime  - monitor for signs of bleeding  - f/u INR everyday   - heparin drip  - received 1 dose coumadin on 4/8  - Hb trending down, f/u repeat at 11 am, keep active T&S   - will hold coumadin tonight     #Febrile overnight   - 100.8, can give tylenol prn for fever   - if spikes again then send cultures     # PE/DVT On Coumadin:   # SLE   # Antiphospholipid Syndrome   # Anemia from blood loss  - c/w heparin drip    #Hematuria secondary to acute cystitis?  - c/w rocephin (x3 days)  -cultures neg   - hematuria clearing     #Hyponatremia  - monitor     # Hypertension   - BP: 118/74 (08 Apr 2025 07:57) (91/61 - 121/79)  - controlled   - no bp med for now     # Misc  - DVT Prophylaxis: heaprin drip   - GI Prophylaxis: pantoprazole    Tablet 40 milliGRAM(s) Oral before breakfast  - Diet: Diet, DASH/TLC  - Activity: Activity - Bedrest  - Code Status: Full

## 2025-04-09 NOTE — PHARMACOTHERAPY INTERVENTION NOTE - COMMENTS
64yFemale    Indication: SLE/APS, DVT/PE in 1990  INR Goal: 2-3  Home Dose: 7.5mg daily per h&p  Bridge Therapy:heparin  Infusion.  Unit(s)/Hr <Continuous>      Current Medications:  cefTRIAXone   IVPB 1000 milliGRAM(s) IV Intermittent every 24 hours  chlorhexidine 2% Cloths 1 Application(s) Topical daily  FLUoxetine 40 milliGRAM(s) Oral daily  heparin   Injectable 6000 Unit(s) IV Push every 6 hours PRN  heparin   Injectable 3000 Unit(s) IV Push every 6 hours PRN  heparin  Infusion.  Unit(s)/Hr IV Continuous <Continuous>  hydroxychloroquine 200 milliGRAM(s) Oral two times a day  influenza   Vaccine 0.5 milliLiter(s) IntraMuscular once  morphine  - Injectable 2 milliGRAM(s) IV Push every 4 hours PRN  ondansetron Injectable 4 milliGRAM(s) IV Push every 8 hours PRN  pantoprazole    Tablet 40 milliGRAM(s) Oral before breakfast      hemoglobin 7.5 g/dL (04-09-25 @ 11:30)    hematocrit 21.9 % (04-09-25 @ 11:30)    PLT: 222 K/uL (04-09-25 @ 11:30)    GFR:97 mL/min/1.73m2 (04-09-25 @ 06:14)      Drug Interactions:     INR trend  13.97 ratio (04-07-25 @ 16:13)  13.12 ratio (04-07-25 @ 20:28)  2.46 ratio (04-08-25 @ 00:28)  1.35 ratio (04-08-25 @ 05:51)  1.13 ratio (04-09-25 @ 06:14)      Warfarin administration history:  warfarin: 1 milliGRAM(s) (04-08-25 @ 22:49)        1. INR today is:       1.13        [ x  ] below goal ----- Patient received Vit K 20mg on 4/7                                            [   ] at goal                                            [   ] above goal ------ This is likely due to        2. As per provider, attending wants to hold off on warfarin as Hb's are downtrending     3. Obtain INR tomorrow AM

## 2025-04-09 NOTE — PROVIDER CONTACT NOTE (FALL NOTIFICATION) - SITUATION
Patient ambulated to bathroom with  while on continuous Heparin infusion. Bed alarm active upon patient arising from bed.

## 2025-04-09 NOTE — RAPID RESPONSE TEAM SUMMARY - NSSITUATIONBACKGROUNDRRT_GEN_ALL_CORE
Rapid called after patient suffered a fall in the rest room. Patient was trying to move the IV pole to the other side and in doing so, lost her balance and fell, hitting her head against the toilet paper king. Patient did not endorse any lightheadedness or dizziness, no LOC. Alert and orientedx4. Able to move all extremities, no focal deficits. Vitals noted to be as follows: Temp 99.3, HR: 68, RR 19, /71   Rapid called after patient lost her balance in the rest room.   Patient was trying to move the IV pole to the other side and in doing so, lost her balance, hitting her head against the toilet paper king, she was then assisted onto the floor by one of the surgery team members who was in the room for the other patient. Patient did not endorse any lightheadedness or dizziness, no LOC. Alert and orientedx4. Able to move all extremities, no focal deficits. Vitals noted to be as follows: Temp 99.3, HR: 68, RR 19, /71   Rapid called after patient lost her balance in the rest room.   Patient was trying to move the IV pole to the other side and in doing so, lost her balance, at the time, patient's  was helping her onto the toilet seat. Surgery team at bedside for the other patient immediately helped her onto the floor, to prevent her from falling. Patient did not endorse any lightheadedness or dizziness, no LOC. Alert and orientedx4. Able to move all extremities, no focal deficits. Vitals noted to be as follows: Temp 99.3, HR: 68, RR 19, /71

## 2025-04-09 NOTE — PROGRESS NOTE ADULT - ASSESSMENT
ASSESSMENT:  64yF w/ PMHx notable for APS, Lupus, DVT/PE on coumadin presenting with 9 days back pain and supra-therapeutic INR found to have large right sided paraspinal intramuscular hematoma. Surgery consulted for eval.  At this time patient is stable, mild physical exam, hgb drop appears to have slowed, and INR has normalized. No surgical intervention indicated at this time    PLAN:  - No acute surgical intervention  - Continue medical management per medicine/hematology for INR  - Trend hgb and INR, transfuse as needed  - Can continue medical management. Hematoma will gradually resorb. If vitals/labs worsen, can consider further imaging, possible IR intervention, and only surgical intervention if signs of skin breakdown.   ASSESSMENT:  64yF w/ PMHx notable for APS, Lupus, DVT/PE on coumadin presenting with 9 days back pain and supra-therapeutic INR found to have large right sided paraspinal intramuscular hematoma. Surgery consulted for eval.  At this time patient is stable, mild physical exam, hgb drop appears to have slowed, and INR has normalized. No surgical intervention indicated at this time    PLAN:  - Continue medical management per medicine/hematology for INR  - Trend hgb and INR, transfuse as needed  - Can continue medical management. Hematoma will gradually resorb. If vitals/labs worsen, can consider further imaging, possible IR intervention, and only surgical intervention if signs of skin breakdown.  - stable right paraspinal intramuscular hematoma, no acute surgical intervention-recall surgery if needed

## 2025-04-10 LAB
ANION GAP SERPL CALC-SCNC: 10 MMOL/L — SIGNIFICANT CHANGE UP (ref 7–14)
APTT BLD: 103.3 SEC — CRITICAL HIGH (ref 27–39.2)
APTT BLD: 46.1 SEC — HIGH (ref 27–39.2)
APTT BLD: 72.9 SEC — CRITICAL HIGH (ref 27–39.2)
BASOPHILS # BLD AUTO: 0.03 K/UL — SIGNIFICANT CHANGE UP (ref 0–0.2)
BASOPHILS NFR BLD AUTO: 0.5 % — SIGNIFICANT CHANGE UP (ref 0–1)
BUN SERPL-MCNC: 13 MG/DL — SIGNIFICANT CHANGE UP (ref 10–20)
CALCIUM SERPL-MCNC: 8.1 MG/DL — LOW (ref 8.4–10.5)
CHLORIDE SERPL-SCNC: 102 MMOL/L — SIGNIFICANT CHANGE UP (ref 98–110)
CO2 SERPL-SCNC: 24 MMOL/L — SIGNIFICANT CHANGE UP (ref 17–32)
CREAT SERPL-MCNC: 0.7 MG/DL — SIGNIFICANT CHANGE UP (ref 0.7–1.5)
EGFR: 97 ML/MIN/1.73M2 — SIGNIFICANT CHANGE UP
EGFR: 97 ML/MIN/1.73M2 — SIGNIFICANT CHANGE UP
EOSINOPHIL # BLD AUTO: 0.07 K/UL — SIGNIFICANT CHANGE UP (ref 0–0.7)
EOSINOPHIL NFR BLD AUTO: 1.2 % — SIGNIFICANT CHANGE UP (ref 0–8)
GLUCOSE SERPL-MCNC: 101 MG/DL — HIGH (ref 70–99)
HCT VFR BLD CALC: 21.2 % — LOW (ref 37–47)
HCT VFR BLD CALC: 25.2 % — LOW (ref 37–47)
HGB BLD-MCNC: 7.2 G/DL — LOW (ref 12–16)
HGB BLD-MCNC: 8.4 G/DL — LOW (ref 12–16)
IMM GRANULOCYTES NFR BLD AUTO: 0.7 % — HIGH (ref 0.1–0.3)
INR BLD: 1.07 RATIO — SIGNIFICANT CHANGE UP (ref 0.65–1.3)
LYMPHOCYTES # BLD AUTO: 0.59 K/UL — LOW (ref 1.2–3.4)
LYMPHOCYTES # BLD AUTO: 10.3 % — LOW (ref 20.5–51.1)
MAGNESIUM SERPL-MCNC: 2 MG/DL — SIGNIFICANT CHANGE UP (ref 1.8–2.4)
MCHC RBC-ENTMCNC: 28.6 PG — SIGNIFICANT CHANGE UP (ref 27–31)
MCHC RBC-ENTMCNC: 29.8 PG — SIGNIFICANT CHANGE UP (ref 27–31)
MCHC RBC-ENTMCNC: 33.3 G/DL — SIGNIFICANT CHANGE UP (ref 32–37)
MCHC RBC-ENTMCNC: 34 G/DL — SIGNIFICANT CHANGE UP (ref 32–37)
MCV RBC AUTO: 85.7 FL — SIGNIFICANT CHANGE UP (ref 81–99)
MCV RBC AUTO: 87.6 FL — SIGNIFICANT CHANGE UP (ref 81–99)
MONOCYTES # BLD AUTO: 0.67 K/UL — HIGH (ref 0.1–0.6)
MONOCYTES NFR BLD AUTO: 11.7 % — HIGH (ref 1.7–9.3)
NEUTROPHILS # BLD AUTO: 4.34 K/UL — SIGNIFICANT CHANGE UP (ref 1.4–6.5)
NEUTROPHILS NFR BLD AUTO: 75.6 % — HIGH (ref 42.2–75.2)
NRBC BLD AUTO-RTO: 0 /100 WBCS — SIGNIFICANT CHANGE UP (ref 0–0)
NRBC BLD AUTO-RTO: 0 /100 WBCS — SIGNIFICANT CHANGE UP (ref 0–0)
PLATELET # BLD AUTO: 249 K/UL — SIGNIFICANT CHANGE UP (ref 130–400)
PLATELET # BLD AUTO: 269 K/UL — SIGNIFICANT CHANGE UP (ref 130–400)
PMV BLD: 11.8 FL — HIGH (ref 7.4–10.4)
PMV BLD: 12.1 FL — HIGH (ref 7.4–10.4)
POTASSIUM SERPL-MCNC: 3.9 MMOL/L — SIGNIFICANT CHANGE UP (ref 3.5–5)
POTASSIUM SERPL-SCNC: 3.9 MMOL/L — SIGNIFICANT CHANGE UP (ref 3.5–5)
PROTHROM AB SERPL-ACNC: 12.7 SEC — SIGNIFICANT CHANGE UP (ref 9.95–12.87)
RBC # BLD: 2.42 M/UL — LOW (ref 4.2–5.4)
RBC # BLD: 2.94 M/UL — LOW (ref 4.2–5.4)
RBC # FLD: 13.2 % — SIGNIFICANT CHANGE UP (ref 11.5–14.5)
RBC # FLD: 14.9 % — HIGH (ref 11.5–14.5)
SODIUM SERPL-SCNC: 136 MMOL/L — SIGNIFICANT CHANGE UP (ref 135–146)
TROPONIN T, HIGH SENSITIVITY RESULT: 18 NG/L — HIGH (ref 6–13)
WBC # BLD: 5.74 K/UL — SIGNIFICANT CHANGE UP (ref 4.8–10.8)
WBC # BLD: 6.59 K/UL — SIGNIFICANT CHANGE UP (ref 4.8–10.8)
WBC # FLD AUTO: 5.74 K/UL — SIGNIFICANT CHANGE UP (ref 4.8–10.8)
WBC # FLD AUTO: 6.59 K/UL — SIGNIFICANT CHANGE UP (ref 4.8–10.8)

## 2025-04-10 PROCEDURE — 93010 ELECTROCARDIOGRAM REPORT: CPT

## 2025-04-10 PROCEDURE — 99233 SBSQ HOSP IP/OBS HIGH 50: CPT

## 2025-04-10 RX ADMIN — Medication 1 APPLICATION(S): at 12:09

## 2025-04-10 RX ADMIN — HEPARIN SODIUM 1800 UNIT(S)/HR: 1000 INJECTION INTRAVENOUS; SUBCUTANEOUS at 04:10

## 2025-04-10 RX ADMIN — HYDROXYCHLOROQUINE SULFATE 200 MILLIGRAM(S): 200 TABLET, FILM COATED ORAL at 17:21

## 2025-04-10 RX ADMIN — HEPARIN SODIUM 1600 UNIT(S)/HR: 1000 INJECTION INTRAVENOUS; SUBCUTANEOUS at 04:22

## 2025-04-10 RX ADMIN — Medication 40 MILLIGRAM(S): at 05:26

## 2025-04-10 RX ADMIN — HEPARIN SODIUM 1600 UNIT(S)/HR: 1000 INJECTION INTRAVENOUS; SUBCUTANEOUS at 07:16

## 2025-04-10 RX ADMIN — HEPARIN SODIUM 1600 UNIT(S)/HR: 1000 INJECTION INTRAVENOUS; SUBCUTANEOUS at 11:26

## 2025-04-10 RX ADMIN — Medication 40 MILLIEQUIVALENT(S): at 10:09

## 2025-04-10 RX ADMIN — HEPARIN SODIUM 1600 UNIT(S)/HR: 1000 INJECTION INTRAVENOUS; SUBCUTANEOUS at 12:32

## 2025-04-10 RX ADMIN — HYDROXYCHLOROQUINE SULFATE 200 MILLIGRAM(S): 200 TABLET, FILM COATED ORAL at 05:26

## 2025-04-10 RX ADMIN — FLUOXETINE HYDROCHLORIDE 40 MILLIGRAM(S): 20 CAPSULE ORAL at 12:09

## 2025-04-10 RX ADMIN — Medication 40 MILLIEQUIVALENT(S): at 11:28

## 2025-04-10 RX ADMIN — HEPARIN SODIUM 3000 UNIT(S): 1000 INJECTION INTRAVENOUS; SUBCUTANEOUS at 19:11

## 2025-04-10 RX ADMIN — HEPARIN SODIUM 1800 UNIT(S)/HR: 1000 INJECTION INTRAVENOUS; SUBCUTANEOUS at 19:08

## 2025-04-10 RX ADMIN — HEPARIN SODIUM 1800 UNIT(S)/HR: 1000 INJECTION INTRAVENOUS; SUBCUTANEOUS at 19:14

## 2025-04-10 NOTE — PHARMACOTHERAPY INTERVENTION NOTE - COMMENTS
64yFemale    Indication: DVT/PE APS  INR Goal: 2-3  Home Dose: 7.5mg daily per H&P  Bridge Therapy:heparin  Infusion.  Unit(s)/Hr <Continuous>      Current Medications:  chlorhexidine 2% Cloths 1 Application(s) Topical daily  FLUoxetine 40 milliGRAM(s) Oral daily  heparin   Injectable 6000 Unit(s) IV Push every 6 hours PRN  heparin   Injectable 3000 Unit(s) IV Push every 6 hours PRN  heparin  Infusion.  Unit(s)/Hr IV Continuous <Continuous>  hydroxychloroquine 200 milliGRAM(s) Oral two times a day  influenza   Vaccine 0.5 milliLiter(s) IntraMuscular once  morphine  - Injectable 2 milliGRAM(s) IV Push every 4 hours PRN  ondansetron Injectable 4 milliGRAM(s) IV Push every 8 hours PRN  pantoprazole    Tablet 40 milliGRAM(s) Oral before breakfast      hemoglobin 7.2 g/dL (04-10-25 @ 07:19)    hematocrit 21.2 % (04-10-25 @ 07:19)    PLT: 249 K/uL (04-10-25 @ 07:19)    GFR:97 mL/min/1.73m2 (04-10-25 @ 07:19)      Drug Interactions:     INR trend  13.97 ratio (04-07-25 @ 16:13)  13.12 ratio (04-07-25 @ 20:28)  2.46 ratio (04-08-25 @ 00:28)  1.35 ratio (04-08-25 @ 05:51)  1.13 ratio (04-09-25 @ 06:14)  1.07 ratio (04-10-25 @ 07:19)      Warfarin administration history:  warfarin: 1 milliGRAM(s) (04-08-25 @ 22:49)        1. INR today is:               [ x  ] below goal ----- This is likely due to Vitamin K given and warfarin held for supratherapuetic INR and bleed                                            [   ] at goal                                            [   ] above goal ------ This is likely due to        2. Discuss with provider(s) listed                                            [   x  ] to consider  holding Warfarin since patient has hematoma Hb trending down and given RBC tranfusion today                                          [     ] provider recommended warfarin                 PO x 1                                           [     ] provider will order warfarin                     PO x 1     3. Obtain INR tomorrow AM

## 2025-04-10 NOTE — PROGRESS NOTE ADULT - ASSESSMENT
64-year-old female with a past medical history of SLE/ Antiphospholipid syndrome , DVT/PE on Coumadin (1990) , Hypertension ; Depression presented to the emergency department complaining of right lower back pain for 8 days.   She endorses that she started having right sided mid- lower back pain after she woke up 8 days back. Back pain was acute in onset; aggravated on movement. Initially did not take any medications for it. She went to the urgent care and started taking tylenol and Muscle Relaxant( Cyclobenzaprine) but did not help. 4 days ago she checked her INR and found to be 8 so stopped taking her Warfarin. INR on 03/30 was 13.2. Does not believe that she had any kind of trauma.   She also started having mery hematuria. Does not mention any burning urination ; increased frequency or incontinence.     #Hematoma  #Supratherapeutic INR   - f/u surgery eval: no acute intervention since no overlying skin breakdown, will resorb overtime  - monitor for signs of bleeding  - f/u INR everyday   - heparin drip  - s/p assisted fall on 4/9, CT head neg   - Hb 7.2<<7  - will tansfuse one unit   - will give coumadin tonight     #T inversion on EKG  - no prior EKG for comparison  - asymptomatic  - monitor for pain, SOB     #Febrile on 4/8- monitor     # PE/DVT On Coumadin:   # SLE   # Antiphospholipid Syndrome   # Anemia from blood loss  - c/w heparin drip    #Hematuria secondary to acute cystitis?  - c/w rocephin (x3 days)  -cultures neg   - hematuria clearing     #Hyponatremia  - monitor     # Hypertension   - BP: 118/74 (08 Apr 2025 07:57) (91/61 - 121/79)  - controlled   - no bp med for now     # Misc  - DVT Prophylaxis: heaprin drip   - GI Prophylaxis: pantoprazole    Tablet 40 milliGRAM(s) Oral before breakfast  - Diet: Diet, DASH/TLC  - Activity: Activity - Bedrest  - Code Status: Full

## 2025-04-10 NOTE — PROGRESS NOTE ADULT - ATTENDING COMMENTS
My note supersedes the resident's note in the event of a discrepancy -    Patient seen and examined this morning  lying in bed  in nad   complains of back pain - bruising evident on back    T(C): 36.8 (04-09-25 @ 08:00), Max: 38.2 (04-08-25 @ 15:00)  HR: 79 (04-09-25 @ 08:00) (79 - 87)  BP: 101/58 (04-09-25 @ 08:00) (98/61 - 106/69)  RR: 18 (04-09-25 @ 08:00) (18 - 19)  SpO2: 98% (04-09-25 @ 08:00) (98% - 98%)    cefTRIAXone   IVPB 1000 milliGRAM(s) IV Intermittent every 24 hours  chlorhexidine 2% Cloths 1 Application(s) Topical daily  FLUoxetine 40 milliGRAM(s) Oral daily  heparin   Injectable 6000 Unit(s) IV Push every 6 hours PRN  heparin   Injectable 3000 Unit(s) IV Push every 6 hours PRN  heparin  Infusion.  Unit(s)/Hr IV Continuous <Continuous>  hydroxychloroquine 200 milliGRAM(s) Oral two times a day  influenza   Vaccine 0.5 milliLiter(s) IntraMuscular once  morphine  - Injectable 2 milliGRAM(s) IV Push every 4 hours PRN  ondansetron Injectable 4 milliGRAM(s) IV Push every 8 hours PRN  pantoprazole    Tablet 40 milliGRAM(s) Oral before breakfast    64-year-old female with a past medical history of SLE/ Antiphospholipid syndrome , DVT/PE on Coumadin (1990) , Hypertension ; Depression presented to the emergency department complaining of right lower back pain for 8 days.   She endorses that she started having right sided mid- lower back pain after she woke up 8 days back. Back pain was acute in onset; aggravated on movement. Initially did not take any medications for it. She went to the urgent care and started taking tylenol and Muscle Relaxant( Cyclobenzaprine) but did not help. 4 days ago she checked her INR and found to be 8 so stopped taking her Warfarin. INR on 03/30 was 13.2. Does not believe that she had any kind of trauma.   She also started having mery hematuria. Does not mention any burning urination ; increased frequency or incontinence.     #right paraspinal intramuscular hematoma   #Supratherapeutic INR - resolved  - surgery eval appreciated - no acute intervention since no overlying skin breakdown, will resorb overtime  - monitor for signs of bleeding  - repeat cbc at 11am today  - f/u daily INF  - heparin drip for now  - received 1 dose coumadin on 4/8  - HOLD COUMADIN TONIGHT  - keep active T&S     #Febrile overnight - etiology? due to hematoma?  - 100.8, can give tylenol prn for fever   - cooling blanket prn   - if spikes again then send cultures     # PE/DVT On Coumadin  # SLE   # Antiphospholipid Syndrome   # Anemia from blood loss  - c/w heparin drip for now  - cbc q12    #Hematuria/UTI  -c/w Rocephin for x3 days today  -cultures neg   -hematuria clearing up     #Hyponatremia  - monitor     # Hypertension with borderline BP  - hold antihypertensives today  - monitor BP closely     Progress Note Handoff  Pending Consults: none  Pending Tests: labs  Pending Results: labs  Family Discussion: Discussed labs, meds, hemoglobin, hematoma and overall plan of care with pt and medical staff. Remains acute. pfd for next week  Disposition: Home__x___/SNF______/Other_____/Unknown at this time_____  Spent 57 min reviewing chart, speaking with patient/family and on coordinating patient care during interdisciplinary rounds
My note supersedes the resident's note in the event of a discrepancy -    Patient seen and examined this morning  lying in bed  in nad   events over the past 24hrs reviewed - assisted lowering to ground in bathroom yesterday     Vital Signs Last 24 Hrs  T(C): 37.2 (10 Apr 2025 07:39), Max: 37.2 (10 Apr 2025 07:39)  T(F): 98.9 (10 Apr 2025 07:39), Max: 98.9 (10 Apr 2025 07:39)  HR: 83 (10 Apr 2025 07:39) (83 - 91)  BP: 96/60 (10 Apr 2025 07:39) (96/60 - 108/63)  BP(mean): --  RR: 19 (09 Apr 2025 23:52) (19 - 19)  SpO2: 97% (10 Apr 2025 07:39) (93% - 97%)    Parameters below as of 09 Apr 2025 23:52  Patient On (Oxygen Delivery Method): room air    chlorhexidine 2% Cloths 1 Application(s) Topical daily  FLUoxetine 40 milliGRAM(s) Oral daily  heparin   Injectable 6000 Unit(s) IV Push every 6 hours PRN  heparin   Injectable 3000 Unit(s) IV Push every 6 hours PRN  heparin  Infusion.  Unit(s)/Hr IV Continuous <Continuous>  hydroxychloroquine 200 milliGRAM(s) Oral two times a day  influenza   Vaccine 0.5 milliLiter(s) IntraMuscular once  morphine  - Injectable 2 milliGRAM(s) IV Push every 4 hours PRN  ondansetron Injectable 4 milliGRAM(s) IV Push every 8 hours PRN  pantoprazole    Tablet 40 milliGRAM(s) Oral before breakfast    64-year-old female with a past medical history of SLE/ Antiphospholipid syndrome , DVT/PE on Coumadin (1990) , Hypertension ; Depression presented to the emergency department complaining of right lower back pain for 8 days.   She endorses that she started having right sided mid- lower back pain after she woke up 8 days back. Back pain was acute in onset; aggravated on movement. Initially did not take any medications for it. She went to the urgent care and started taking tylenol and Muscle Relaxant( Cyclobenzaprine) but did not help. 4 days ago she checked her INR and found to be 8 so stopped taking her Warfarin. INR on 03/30 was 13.2. Does not believe that she had any kind of trauma.   She also started having mery hematuria. Does not mention any burning urination ; increased frequency or incontinence.     #right paraspinal intramuscular hematoma   #Supratherapeutic INR - resolved  - surgery eval appreciated - no acute intervention since no overlying skin breakdown, will resorb overtime  - monitor for signs of bleeding  - repeat cbc in am  - f/u daily INF  - heparin drip for now- rate adjusted this morning  - received 1 dose coumadin on 4/8  - resume COUMADIN   - keep active T&S   - hgb stable but borderline - transfuse 1 unit prbc today     #Febrile 4/8- etiology? due to hematoma?  - 100.8, can give tylenol prn for fever   - cooling blanket prn   - if spikes again then send cultures     # PE/DVT On Coumadin  # SLE   # Antiphospholipid Syndrome   # Anemia from blood loss  - c/w heparin drip for now  - cbc q12    #Hematuria/UTI  -s/p Rocephin for x3 days   -cultures neg   -hematuria resolving     #Hyponatremia  - monitor     # Hypertension with borderline BP  - holding antihypertensives   - monitor BP closely   - fall precautions  - oob with assistance at all times    Progress Note Handoff  Pending Consults: none  Pending Tests: labs  Pending Results: labs  Family Discussion: Discussed labs, meds, hemoglobin, hematoma, transfusion and overall plan of care with pt and medical staff. Remains acute. pfd for next week  Disposition: Home__x___/SNF______/Other_____/Unknown at this time_____  Spent 55 min reviewing chart, speaking with patient/family and on coordinating patient care during interdisciplinary rounds .
Some more ecchymosis in back but no evidence of skin compromise. Hemoglobin trending down, but does not appear to have increasing swelling in the back. No acute surgical intervention for right paraspinal intramuscular hematoma. Please call back with questions.

## 2025-04-11 LAB
ANION GAP SERPL CALC-SCNC: 8 MMOL/L — SIGNIFICANT CHANGE UP (ref 7–14)
APTT BLD: 57.1 SEC — HIGH (ref 27–39.2)
APTT BLD: 96.8 SEC — CRITICAL HIGH (ref 27–39.2)
APTT BLD: 99.6 SEC — CRITICAL HIGH (ref 27–39.2)
BASOPHILS # BLD AUTO: 0.04 K/UL — SIGNIFICANT CHANGE UP (ref 0–0.2)
BASOPHILS NFR BLD AUTO: 0.7 % — SIGNIFICANT CHANGE UP (ref 0–1)
BUN SERPL-MCNC: 14 MG/DL — SIGNIFICANT CHANGE UP (ref 10–20)
CALCIUM SERPL-MCNC: 8.2 MG/DL — LOW (ref 8.4–10.5)
CHLORIDE SERPL-SCNC: 106 MMOL/L — SIGNIFICANT CHANGE UP (ref 98–110)
CO2 SERPL-SCNC: 24 MMOL/L — SIGNIFICANT CHANGE UP (ref 17–32)
CREAT SERPL-MCNC: 0.7 MG/DL — SIGNIFICANT CHANGE UP (ref 0.7–1.5)
EGFR: 97 ML/MIN/1.73M2 — SIGNIFICANT CHANGE UP
EGFR: 97 ML/MIN/1.73M2 — SIGNIFICANT CHANGE UP
EOSINOPHIL # BLD AUTO: 0.11 K/UL — SIGNIFICANT CHANGE UP (ref 0–0.7)
EOSINOPHIL NFR BLD AUTO: 2 % — SIGNIFICANT CHANGE UP (ref 0–8)
GLUCOSE SERPL-MCNC: 99 MG/DL — SIGNIFICANT CHANGE UP (ref 70–99)
HCT VFR BLD CALC: 26.1 % — LOW (ref 37–47)
HGB BLD-MCNC: 8.6 G/DL — LOW (ref 12–16)
IMM GRANULOCYTES NFR BLD AUTO: 0.9 % — HIGH (ref 0.1–0.3)
INR BLD: 1.13 RATIO — SIGNIFICANT CHANGE UP (ref 0.65–1.3)
LYMPHOCYTES # BLD AUTO: 0.64 K/UL — LOW (ref 1.2–3.4)
LYMPHOCYTES # BLD AUTO: 11.6 % — LOW (ref 20.5–51.1)
MAGNESIUM SERPL-MCNC: 2.1 MG/DL — SIGNIFICANT CHANGE UP (ref 1.8–2.4)
MCHC RBC-ENTMCNC: 28.6 PG — SIGNIFICANT CHANGE UP (ref 27–31)
MCHC RBC-ENTMCNC: 33 G/DL — SIGNIFICANT CHANGE UP (ref 32–37)
MCV RBC AUTO: 86.7 FL — SIGNIFICANT CHANGE UP (ref 81–99)
MONOCYTES # BLD AUTO: 0.67 K/UL — HIGH (ref 0.1–0.6)
MONOCYTES NFR BLD AUTO: 12.1 % — HIGH (ref 1.7–9.3)
NEUTROPHILS # BLD AUTO: 4.01 K/UL — SIGNIFICANT CHANGE UP (ref 1.4–6.5)
NEUTROPHILS NFR BLD AUTO: 72.7 % — SIGNIFICANT CHANGE UP (ref 42.2–75.2)
NRBC BLD AUTO-RTO: 0 /100 WBCS — SIGNIFICANT CHANGE UP (ref 0–0)
PLATELET # BLD AUTO: 274 K/UL — SIGNIFICANT CHANGE UP (ref 130–400)
PMV BLD: 11.8 FL — HIGH (ref 7.4–10.4)
POTASSIUM SERPL-MCNC: 4.5 MMOL/L — SIGNIFICANT CHANGE UP (ref 3.5–5)
POTASSIUM SERPL-SCNC: 4.5 MMOL/L — SIGNIFICANT CHANGE UP (ref 3.5–5)
PROTHROM AB SERPL-ACNC: 13.4 SEC — HIGH (ref 9.95–12.87)
RBC # BLD: 3.01 M/UL — LOW (ref 4.2–5.4)
RBC # FLD: 15.3 % — HIGH (ref 11.5–14.5)
SODIUM SERPL-SCNC: 138 MMOL/L — SIGNIFICANT CHANGE UP (ref 135–146)
WBC # BLD: 5.52 K/UL — SIGNIFICANT CHANGE UP (ref 4.8–10.8)
WBC # FLD AUTO: 5.52 K/UL — SIGNIFICANT CHANGE UP (ref 4.8–10.8)

## 2025-04-11 PROCEDURE — 99232 SBSQ HOSP IP/OBS MODERATE 35: CPT

## 2025-04-11 RX ADMIN — HEPARIN SODIUM UNIT(S)/HR: 1000 INJECTION INTRAVENOUS; SUBCUTANEOUS at 08:24

## 2025-04-11 RX ADMIN — HEPARIN SODIUM UNIT(S)/HR: 1000 INJECTION INTRAVENOUS; SUBCUTANEOUS at 19:14

## 2025-04-11 RX ADMIN — Medication 1 APPLICATION(S): at 11:27

## 2025-04-11 RX ADMIN — FLUOXETINE HYDROCHLORIDE 40 MILLIGRAM(S): 20 CAPSULE ORAL at 11:25

## 2025-04-11 RX ADMIN — Medication 40 MILLIGRAM(S): at 05:27

## 2025-04-11 RX ADMIN — HEPARIN SODIUM UNIT(S)/HR: 1000 INJECTION INTRAVENOUS; SUBCUTANEOUS at 17:55

## 2025-04-11 RX ADMIN — HYDROXYCHLOROQUINE SULFATE 200 MILLIGRAM(S): 200 TABLET, FILM COATED ORAL at 17:58

## 2025-04-11 RX ADMIN — HYDROXYCHLOROQUINE SULFATE 200 MILLIGRAM(S): 200 TABLET, FILM COATED ORAL at 05:26

## 2025-04-11 RX ADMIN — Medication 1 MILLIGRAM(S): at 21:28

## 2025-04-11 RX ADMIN — HEPARIN SODIUM UNIT(S)/HR: 1000 INJECTION INTRAVENOUS; SUBCUTANEOUS at 18:29

## 2025-04-11 RX ADMIN — HEPARIN SODIUM UNIT(S)/HR: 1000 INJECTION INTRAVENOUS; SUBCUTANEOUS at 03:27

## 2025-04-11 RX ADMIN — HEPARIN SODIUM UNIT(S)/HR: 1000 INJECTION INTRAVENOUS; SUBCUTANEOUS at 07:22

## 2025-04-11 RX ADMIN — HEPARIN SODIUM UNIT(S)/HR: 1000 INJECTION INTRAVENOUS; SUBCUTANEOUS at 01:31

## 2025-04-11 NOTE — DISCHARGE NOTE PROVIDER - NSDCCPCAREPLAN_GEN_ALL_CORE_FT
PRINCIPAL DISCHARGE DIAGNOSIS  Diagnosis: Supratherapeutic INR  Assessment and Plan of Treatment: You came to the emergency room because of right lower back pain that started about 8 days ago. We found that you had a collection of blood in the muscles of your right lower back, called a hematoma. This was likely because your blood thinner, Coumadin, was too high when you came in. Your INR, which measures your blood thinning, was very high initially.  We gave you one unit of blood because you had become anemic, likely from the bleeding. We also gave you antibiotics for a few days because you had blood in your urine, although we didn't find any infection. We checked with the surgery team about your back, and they felt that surgery wasn't needed and the hematoma will get better on its own over time.  Today, your INR was below the ideal range. We discussed your options, but you've decided to leave the hospital today against medical advice. We will repeat a CT scan of your abdomen and pelvis and discharge you right after.   It's important for you to follow up with your doctor as an outpatient soon to check on your blood thinning levels. If you have any increasing pain, weakness, or notice any new problems, please come back to the emergency room right away.      SECONDARY DISCHARGE DIAGNOSES  Diagnosis: Intramuscular hematoma  Assessment and Plan of Treatment:      PRINCIPAL DISCHARGE DIAGNOSIS  Diagnosis: Supratherapeutic INR  Assessment and Plan of Treatment: You came to the emergency room because of right lower back pain that started about 8 days ago. We found that you had a collection of blood in the muscles of your right lower back, called a hematoma. This was likely because your blood thinner, Coumadin, was too high when you came in. Your INR, which measures your blood thinning, was very high initially.  We gave you one unit of blood because you had become anemic, likely from the bleeding. We also gave you antibiotics for a few days because you had blood in your urine, although we didn't find any infection. We checked with the surgery team about your back, and they felt that surgery wasn't needed and the hematoma will get better on its own over time. Repeat CT scan showed the hematoma to be stable. we will be sending you home on lovenox and coumadin until your INR becomes therapeutic.   It's important for you to follow up with your doctor as an outpatient soon to check on your blood thinning levels. If you have any increasing pain, weakness, or notice any new problems, please come back to the emergency room right away.      SECONDARY DISCHARGE DIAGNOSES  Diagnosis: Intramuscular hematoma  Assessment and Plan of Treatment:

## 2025-04-11 NOTE — PROGRESS NOTE ADULT - ASSESSMENT
64-year-old female with a past medical history of SLE/ Antiphospholipid syndrome , DVT/PE on Coumadin (1990) , Hypertension ; Depression presented to the emergency department complaining of right lower back pain for 8 days.   She endorses that she started having right sided mid- lower back pain after she woke up 8 days back. Back pain was acute in onset; aggravated on movement. Initially did not take any medications for it. She went to the urgent care and started taking tylenol and Muscle Relaxant( Cyclobenzaprine) but did not help. 4 days ago she checked her INR and found to be 8 so stopped taking her Warfarin. INR on 03/30 was 13.2. Does not believe that she had any kind of trauma.   She also started having mery hematuria. Does not mention any burning urination ; increased frequency or incontinence.     #right paraspinal intramuscular hematoma - stable  #Supratherapeutic INR - resolved  - surgery eval appreciated - no acute intervention since no overlying skin breakdown, will resorb overtime  - monitor for signs of bleeding  - repeat cbc daily  - f/u daily INF  - heparin drip for now- rate adjusted daily by RN  - received 1 dose coumadin on 4/8  - resume COUMADIN tonight   - keep active T&S   - 4/9 hgb stable but borderline - transfused 1 unit prbc     #Febrile 4/8- etiology? due to hematoma?  - 100.8, can give tylenol prn for fever   - cooling blanket prn   - if spikes again then send cultures     # PE/DVT On Coumadin  # SLE   # Antiphospholipid Syndrome   # Anemia from blood loss  - c/w heparin drip for now  - cbc daily  - resume coumadin tonight    #Hematuria/UTI - resolving  -s/p Rocephin for x3 days   -cultures neg     #Hyponatremia - resolved  - monitor as outpatient    # Hypertension with borderline BP  - holding antihypertensives   - monitor BP closely   - fall precautions  - oob with assistance at all times - discussed with patient daily     Progress Note Handoff  Pending Consults: none  Pending Tests: labs  Pending Results: labs  Family Discussion: Discussed labs, meds, hemoglobin, hematoma, transfusion and overall plan of care with pt and medical staff. Remains acute. pfd for early next week  Disposition: Home__x___/SNF______/Other_____/Unknown at this time_____  Spent 55 min reviewing chart, speaking with patient/family and on coordinating patient care during interdisciplinary rounds .

## 2025-04-11 NOTE — DISCHARGE NOTE PROVIDER - NSDCFUSCHEDAPPT_GEN_ALL_CORE_FT
Gerard Beard  [facility] Physician [facility]  CARDIOLOGY [address]  Scheduled Appointment: 04/15/2025

## 2025-04-11 NOTE — DISCHARGE NOTE PROVIDER - CARE PROVIDER_API CALL
Miguel Wilson  Internal Medicine  [address] [place] [address]  Phone: [phone]  Fax: [phone]  Follow Up Time: 2 weeks

## 2025-04-11 NOTE — DISCHARGE NOTE PROVIDER - HOSPITAL COURSE
64-year-old female with a past medical history of SLE/ Antiphospholipid syndrome , DVT/PE on Coumadin (1990) , Hypertension ; Depression presented to the emergency department complaining of right lower back pain for 8 days.   She endorses that she started having right sided mid- lower back pain after she woke up 8 days back. Back pain was acute in onset; aggravated on movement. Initially did not take any medications for it. She went to the urgent care and started taking tylenol and Muscle Relaxant( Cyclobenzaprine) but did not help. 4 days ago she checked her INR and found to be 8 so stopped taking her Warfarin. INR on 03/30 was 13.2. Does not believe that she had any kind of trauma.   She also started having mery hematuria. Does not mention any burning urination ; increased frequency or incontinence.     #right paraspinal intramuscular hematoma   #Supratherapeutic INR - resolved  - surgery eval appreciated - no acute intervention since no overlying skin breakdown, will resorb overtime  - monitor for signs of bleeding  - f/u daily INR and cbc   - resume coumadin tonight   - s/p 1 unit prbc     #Febrile 4/8- etiology? due to hematoma?  -afebrile since 4/8     # PE/DVT On Coumadin  # SLE   # Antiphospholipid Syndrome   # Anemia from blood loss  - c/w heparin drip for now  - cbc q12    #Hematuria/UTI  -s/p Rocephin for x3 days   -cultures neg   -hematuria initially resolving, now becoming increasingly dark     Discussion of discharge plan of care, including discharge diagnoses, medication reconciliation, and follow-ups was conducted with  *****on ***** at *****, and discharge was approved.   64-year-old female with a past medical history of SLE/ Antiphospholipid syndrome , DVT/PE on Coumadin (1990) , Hypertension ; Depression presented to the emergency department complaining of right lower back pain for 8 days.   She endorses that she started having right sided mid- lower back pain after she woke up 8 days back. Back pain was acute in onset; aggravated on movement. Initially did not take any medications for it. She went to the urgent care and started taking tylenol and Muscle Relaxant( Cyclobenzaprine) but did not help. 4 days ago she checked her INR and found to be 8 so stopped taking her Warfarin. INR on 03/30 was 13.2. Does not believe that she had any kind of trauma.   She also started having mery hematuria. Does not mention any burning urination ; increased frequency or incontinence.     # PE/DVT On Coumadin  # SLE   # Antiphospholipid Syndrome   # Anemia from blood loss  #right paraspinal intramuscular hematoma   #Supratherapeutic INR - resolved  - surgery eval appreciated - no acute intervention since no overlying skin breakdown, will resorb overtime  - s/p 1 unit prbc   - today INR noted to be subtherapeutic  however patient adamant on being dc today, after discussion agreed to leave AMA after repeat CTAP    #Assisted fall   #Febrile 4/8- resolved     #Hematuria/UTI-resolved   -s/p Rocephin for x3 days   -cultures neg       Discussion of discharge plan of care, including discharge diagnoses, medication reconciliation, and follow-ups was conducted with Dr. Casas on 4/15/2025 at bedside, and discharge was approved.   64-year-old female with a past medical history of SLE/ Antiphospholipid syndrome , DVT/PE on Coumadin (1990) , Hypertension ; Depression presented to the emergency department complaining of right lower back pain for 8 days.   She endorses that she started having right sided mid- lower back pain after she woke up 8 days back. Back pain was acute in onset; aggravated on movement. Initially did not take any medications for it. She went to the urgent care and started taking tylenol and Muscle Relaxant( Cyclobenzaprine) but did not help. 4 days ago she checked her INR and found to be 8 so stopped taking her Warfarin. INR on 03/30 was 13.2. Does not believe that she had any kind of trauma.   She also started having mery hematuria. Does not mention any burning urination ; increased frequency or incontinence.     # PE/DVT On Coumadin  # SLE   # Antiphospholipid Syndrome   # Anemia from blood loss  #right paraspinal intramuscular hematoma   #Supratherapeutic INR - resolved  - surgery eval appreciated - no acute intervention since no overlying skin breakdown, will resorb overtime  - s/p 1 unit prbc   - plan to dc on lovenox bridge with coumadin    #Assisted fall   #Febrile 4/8- resolved     #Hematuria/UTI-resolved   -s/p Rocephin for x3 days   -cultures neg       Discussion of discharge plan of care, including discharge diagnoses, medication reconciliation, and follow-ups was conducted with Dr. Casas on 4/16/2025 at bedside, and discharge was approved.

## 2025-04-11 NOTE — DISCHARGE NOTE PROVIDER - NSDCMRMEDTOKEN_GEN_ALL_CORE_FT
alendronate 70 mg oral tablet: 1 tab(s) orally once a week  FLUoxetine 40 mg oral capsule: 1 cap(s) orally once a day  hydroxychloroquine 200 mg oral tablet: 1 tab(s) orally 2 times a day  NIFEdipine (Eqv-Procardia XL) 30 mg oral tablet, extended release: 1 tab(s) orally once a day  warfarin 7.5 mg oral tablet: 1 tab(s) orally once a day   alendronate 70 mg oral tablet: 1 tab(s) orally once a week  FLUoxetine 40 mg oral capsule: 1 cap(s) orally once a day  hydroxychloroquine 200 mg oral tablet: 1 tab(s) orally 2 times a day  Lovenox 80 mg/0.8 mL injectable solution: 80 milligram(s) subcutaneously 2 times a day  NIFEdipine (Eqv-Procardia XL) 30 mg oral tablet, extended release: 1 tab(s) orally once a day  warfarin 7.5 mg oral tablet: 1 tab(s) orally once a day

## 2025-04-11 NOTE — PHARMACOTHERAPY INTERVENTION NOTE - COMMENTS
64yFemale    Indication: APS, DVT/PE  INR Goal: 2-3  Home Dose: 7.5 mg daily  Bridge Therapy:heparin  Infusion.  Unit(s)/Hr <Continuous>      Current Medications:  chlorhexidine 2% Cloths 1 Application(s) Topical daily  FLUoxetine 40 milliGRAM(s) Oral daily  heparin   Injectable 6000 Unit(s) IV Push every 6 hours PRN  heparin   Injectable 3000 Unit(s) IV Push every 6 hours PRN  heparin  Infusion.  Unit(s)/Hr IV Continuous <Continuous>  hydroxychloroquine 200 milliGRAM(s) Oral two times a day  influenza   Vaccine 0.5 milliLiter(s) IntraMuscular once  morphine  - Injectable 2 milliGRAM(s) IV Push every 4 hours PRN  ondansetron Injectable 4 milliGRAM(s) IV Push every 8 hours PRN  pantoprazole    Tablet 40 milliGRAM(s) Oral before breakfast  warfarin 1 milliGRAM(s) Oral once      hemoglobin 8.6 g/dL (04-11-25 @ 06:30)    hematocrit 26.1 % (04-11-25 @ 06:30)    PLT: 274 K/uL (04-11-25 @ 06:30)    GFR:97 mL/min/1.73m2 (04-11-25 @ 06:30)        Drug Interactions:   Noted fluoxetine can enhance the anticoagulant effect of warfarin    INR trend  13.97 ratio (04-07-25 @ 16:13)  13.12 ratio (04-07-25 @ 20:28)  2.46 ratio (04-08-25 @ 00:28)  1.35 ratio (04-08-25 @ 05:51)  1.13 ratio (04-09-25 @ 06:14)  1.07 ratio (04-10-25 @ 07:19)  1.13 ratio (04-11-25 @ 06:30)      Warfarin administration history:  warfarin: 1 milliGRAM(s) (04-08-25 @ 22:49)          1. INR today is:               [ x ] below goal ----- This is likely due to warfarin being held and vitamin K administered on 4/7                                            [   ] at goal                                            [   ] above goal ------ This is likely due to        2. Agree with warfarin 1 mg as ordered per provider. Patient came in with extremely elevated INR so therefore may need to re-titrate a new regimen for patient    3. Obtain INR tomorrow AM

## 2025-04-12 LAB
ANION GAP SERPL CALC-SCNC: 11 MMOL/L — SIGNIFICANT CHANGE UP (ref 7–14)
BASOPHILS # BLD AUTO: 0.03 K/UL — SIGNIFICANT CHANGE UP (ref 0–0.2)
BASOPHILS NFR BLD AUTO: 0.7 % — SIGNIFICANT CHANGE UP (ref 0–1)
BUN SERPL-MCNC: 14 MG/DL — SIGNIFICANT CHANGE UP (ref 10–20)
CALCIUM SERPL-MCNC: 8.4 MG/DL — SIGNIFICANT CHANGE UP (ref 8.4–10.5)
CHLORIDE SERPL-SCNC: 105 MMOL/L — SIGNIFICANT CHANGE UP (ref 98–110)
CO2 SERPL-SCNC: 23 MMOL/L — SIGNIFICANT CHANGE UP (ref 17–32)
CREAT SERPL-MCNC: 0.7 MG/DL — SIGNIFICANT CHANGE UP (ref 0.7–1.5)
EGFR: 97 ML/MIN/1.73M2 — SIGNIFICANT CHANGE UP
EGFR: 97 ML/MIN/1.73M2 — SIGNIFICANT CHANGE UP
EOSINOPHIL # BLD AUTO: 0.1 K/UL — SIGNIFICANT CHANGE UP (ref 0–0.7)
EOSINOPHIL NFR BLD AUTO: 2.4 % — SIGNIFICANT CHANGE UP (ref 0–8)
GLUCOSE SERPL-MCNC: 102 MG/DL — HIGH (ref 70–99)
HCT VFR BLD CALC: 25.4 % — LOW (ref 37–47)
HGB BLD-MCNC: 8.3 G/DL — LOW (ref 12–16)
IMM GRANULOCYTES NFR BLD AUTO: 1.2 % — HIGH (ref 0.1–0.3)
INR BLD: 1.11 RATIO — SIGNIFICANT CHANGE UP (ref 0.65–1.3)
LYMPHOCYTES # BLD AUTO: 0.56 K/UL — LOW (ref 1.2–3.4)
LYMPHOCYTES # BLD AUTO: 13.6 % — LOW (ref 20.5–51.1)
MAGNESIUM SERPL-MCNC: 2.2 MG/DL — SIGNIFICANT CHANGE UP (ref 1.8–2.4)
MCHC RBC-ENTMCNC: 28.6 PG — SIGNIFICANT CHANGE UP (ref 27–31)
MCHC RBC-ENTMCNC: 32.7 G/DL — SIGNIFICANT CHANGE UP (ref 32–37)
MCV RBC AUTO: 87.6 FL — SIGNIFICANT CHANGE UP (ref 81–99)
MONOCYTES # BLD AUTO: 0.45 K/UL — SIGNIFICANT CHANGE UP (ref 0.1–0.6)
MONOCYTES NFR BLD AUTO: 10.9 % — HIGH (ref 1.7–9.3)
NEUTROPHILS # BLD AUTO: 2.94 K/UL — SIGNIFICANT CHANGE UP (ref 1.4–6.5)
NEUTROPHILS NFR BLD AUTO: 71.2 % — SIGNIFICANT CHANGE UP (ref 42.2–75.2)
NRBC BLD AUTO-RTO: 0 /100 WBCS — SIGNIFICANT CHANGE UP (ref 0–0)
PLATELET # BLD AUTO: 286 K/UL — SIGNIFICANT CHANGE UP (ref 130–400)
PMV BLD: 11.7 FL — HIGH (ref 7.4–10.4)
POTASSIUM SERPL-MCNC: 4.4 MMOL/L — SIGNIFICANT CHANGE UP (ref 3.5–5)
POTASSIUM SERPL-SCNC: 4.4 MMOL/L — SIGNIFICANT CHANGE UP (ref 3.5–5)
PROTHROM AB SERPL-ACNC: 13.1 SEC — HIGH (ref 9.95–12.87)
RBC # BLD: 2.9 M/UL — LOW (ref 4.2–5.4)
RBC # FLD: 14.7 % — HIGH (ref 11.5–14.5)
SODIUM SERPL-SCNC: 139 MMOL/L — SIGNIFICANT CHANGE UP (ref 135–146)
WBC # BLD: 4.13 K/UL — LOW (ref 4.8–10.8)
WBC # FLD AUTO: 4.13 K/UL — LOW (ref 4.8–10.8)

## 2025-04-12 PROCEDURE — 99232 SBSQ HOSP IP/OBS MODERATE 35: CPT

## 2025-04-12 RX ORDER — ENOXAPARIN SODIUM 100 MG/ML
70 INJECTION SUBCUTANEOUS EVERY 12 HOURS
Refills: 0 | Status: DISCONTINUED | OUTPATIENT
Start: 2025-04-12 | End: 2025-04-12

## 2025-04-12 RX ORDER — ENOXAPARIN SODIUM 100 MG/ML
70 INJECTION SUBCUTANEOUS EVERY 12 HOURS
Refills: 0 | Status: DISCONTINUED | OUTPATIENT
Start: 2025-04-12 | End: 2025-04-16

## 2025-04-12 RX ADMIN — ENOXAPARIN SODIUM 70 MILLIGRAM(S): 100 INJECTION SUBCUTANEOUS at 14:05

## 2025-04-12 RX ADMIN — Medication 5 MILLIGRAM(S): at 21:08

## 2025-04-12 RX ADMIN — HEPARIN SODIUM UNIT(S)/HR: 1000 INJECTION INTRAVENOUS; SUBCUTANEOUS at 08:59

## 2025-04-12 RX ADMIN — Medication 1 APPLICATION(S): at 11:10

## 2025-04-12 RX ADMIN — Medication 40 MILLIGRAM(S): at 05:14

## 2025-04-12 RX ADMIN — HYDROXYCHLOROQUINE SULFATE 200 MILLIGRAM(S): 200 TABLET, FILM COATED ORAL at 17:00

## 2025-04-12 RX ADMIN — HYDROXYCHLOROQUINE SULFATE 200 MILLIGRAM(S): 200 TABLET, FILM COATED ORAL at 05:14

## 2025-04-12 RX ADMIN — HEPARIN SODIUM UNIT(S)/HR: 1000 INJECTION INTRAVENOUS; SUBCUTANEOUS at 07:12

## 2025-04-12 RX ADMIN — FLUOXETINE HYDROCHLORIDE 40 MILLIGRAM(S): 20 CAPSULE ORAL at 11:10

## 2025-04-12 NOTE — PHARMACOTHERAPY INTERVENTION NOTE - COMMENTS
64yFemale    Indication: APS, DVT/PE  INR Goal: 2-3  Home Dose: 7.5 mg daily  Bridge Therapy:enoxaparin Injectable 70 milliGRAM(s) every 12 hours      Current Medications:  chlorhexidine 2% Cloths 1 Application(s) Topical daily  enoxaparin Injectable 70 milliGRAM(s) SubCutaneous every 12 hours  FLUoxetine 40 milliGRAM(s) Oral daily  hydroxychloroquine 200 milliGRAM(s) Oral two times a day  influenza   Vaccine 0.5 milliLiter(s) IntraMuscular once  morphine  - Injectable 2 milliGRAM(s) IV Push every 4 hours PRN  ondansetron Injectable 4 milliGRAM(s) IV Push every 8 hours PRN  pantoprazole    Tablet 40 milliGRAM(s) Oral before breakfast  warfarin 5 milliGRAM(s) Oral once      hemoglobin 8.3 g/dL (04-12-25 @ 08:19)    hematocrit 25.4 % (04-12-25 @ 08:19)    PLT: 286 K/uL (04-12-25 @ 08:19)    GFR:97 mL/min/1.73m2 (04-12-25 @ 08:19)      Drug Interactions: Fluoxetine    INR trend  13.97 ratio (04-07-25 @ 16:13)  13.12 ratio (04-07-25 @ 20:28)  2.46 ratio (04-08-25 @ 00:28)  1.35 ratio (04-08-25 @ 05:51)  1.13 ratio (04-09-25 @ 06:14)  1.07 ratio (04-10-25 @ 07:19)  1.13 ratio (04-11-25 @ 06:30)  1.11 ratio (04-12-25 @ 08:19)      Warfarin administration history:  warfarin: 1 milliGRAM(s) (04-08-25 @ 22:49)  warfarin: 1 milliGRAM(s) (04-11-25 @ 21:28)        1. INR today is: 1.11              [ x ] below goal ----- This is likely due to initiating warfarin with low doses due to the concern of her INR being supratherapeutic on admission. After discussing with the provider, patient was taking acetaminophen and doxycycline that could potentially elevate her INR in the outpatient setting.                                            [   ] at goal                                            [   ] above goal ------ This is likely due to        2. Discuss with provider(s) listed                                            [     ] to consider  Warfarin           PO x 1                                            [  X  ] provider recommended warfarin 5 mg PO x 1. Agree with 5 mg dose and will continue to monitor INR                                           [  X  ] provider will order warfarin 5 mg PO x 1     3. Obtain INR tomorrow AM     64yFemale    Indication: APS, DVT/PE  INR Goal: 2-3  Home Dose: 7.5 mg daily  Bridge Therapy:enoxaparin Injectable 70 milliGRAM(s) every 12 hours      Current Medications:  chlorhexidine 2% Cloths 1 Application(s) Topical daily  enoxaparin Injectable 70 milliGRAM(s) SubCutaneous every 12 hours  FLUoxetine 40 milliGRAM(s) Oral daily  hydroxychloroquine 200 milliGRAM(s) Oral two times a day  influenza   Vaccine 0.5 milliLiter(s) IntraMuscular once  morphine  - Injectable 2 milliGRAM(s) IV Push every 4 hours PRN  ondansetron Injectable 4 milliGRAM(s) IV Push every 8 hours PRN  pantoprazole    Tablet 40 milliGRAM(s) Oral before breakfast  warfarin 5 milliGRAM(s) Oral once      hemoglobin 8.3 g/dL (04-12-25 @ 08:19)    hematocrit 25.4 % (04-12-25 @ 08:19)    PLT: 286 K/uL (04-12-25 @ 08:19)    GFR:97 mL/min/1.73m2 (04-12-25 @ 08:19)      Drug Interactions: Fluoxetine    INR trend  13.97 ratio (04-07-25 @ 16:13)  13.12 ratio (04-07-25 @ 20:28)  2.46 ratio (04-08-25 @ 00:28)  1.35 ratio (04-08-25 @ 05:51)  1.13 ratio (04-09-25 @ 06:14)  1.07 ratio (04-10-25 @ 07:19)  1.13 ratio (04-11-25 @ 06:30)  1.11 ratio (04-12-25 @ 08:19)      Warfarin administration history:  warfarin: 1 milliGRAM(s) (04-08-25 @ 22:49)  warfarin: 1 milliGRAM(s) (04-11-25 @ 21:28)        1. INR today is: 1.11              [ x ] below goal ----- This is likely due to initiating warfarin with low doses due to the concern for supratherapeutic INR on admission. Per provider, patient was taking acetaminophen and doxycycline that could potentially have caused the supratherapeutic INR in the outpatient setting.                                            [   ] at goal                                            [   ] above goal ------ This is likely due to        2. Discuss with provider(s) listed                                            [     ] to consider  Warfarin           PO x 1                                            [  X  ] provider recommended warfarin 5 mg PO x 1. Agree with 5 mg dose and will continue to monitor INR                                           [  X  ] provider will order warfarin 5 mg PO x 1     3. Obtain INR tomorrow AM

## 2025-04-12 NOTE — PROGRESS NOTE ADULT - ASSESSMENT
64-year-old female with a past medical history of SLE/ Antiphospholipid syndrome , DVT/PE on Coumadin (1990) , Hypertension ; Depression presented to the emergency department complaining of right lower back pain for 8 days.   She endorses that she started having right sided mid- lower back pain after she woke up 8 days back. Back pain was acute in onset; aggravated on movement. Initially did not take any medications for it. She went to the urgent care and started taking tylenol and Muscle Relaxant( Cyclobenzaprine) but did not help. 4 days ago she checked her INR and found to be 8 so stopped taking her Warfarin. INR on 03/30 was 13.2. Does not believe that she had any kind of trauma.   She also started having mery hematuria. Does not mention any burning urination ; increased frequency or incontinence.     #right paraspinal intramuscular hematoma - stable  #Supratherapeutic INR - resolved  - surgery eval appreciated - no acute intervention since no overlying skin breakdown, will resorb overtime  - monitor for signs of bleeding  - repeat cbc daily  - f/u daily INR  - DC heparin drip today and start Lovenox  - received 1 dose coumadin on 4/8 - patient takes 7.5mg at home - speaking with clinical pharmacist to increase dose tonight  - keep active T&S   - 4/9 hgb stable but borderline - transfused 1 unit prbc     #Febrile 4/8- etiology? due to hematoma?  - 100.8, can give tylenol prn for fever   - if spikes again then send cultures     # PE/DVT On Coumadin  # SLE   # Antiphospholipid Antibody Syndrome   # Anemia from blood loss - stable  - dc heparin drip and start Lovenox  - cbc daily  - resume coumadin tonight  - continue hydroxychloroquine     #Hematuria/UTI - resolving  -s/p Rocephin for x3 days   -cultures neg     #Hyponatremia - resolved  - monitor as outpatient    # Hypertension with borderline BP  - holding antihypertensives   - monitor BP closely   - fall precautions  - oob with assistance at all times - discussed with patient daily   - mattress alarm on    Progress Note Handoff  Pending Consults: none  Pending Tests: labs  Pending Results: labs  Family Discussion: Discussed labs, meds, hemoglobin, hematoma, transfusion and overall plan of care with pt and medical staff. Remains acute. pfd for early next week - Monday?  Disposition: Home__x___/SNF______/Other_____/Unknown at this time_____  Spent 55 min reviewing chart, speaking with patient/family and on coordinating patient care during interdisciplinary rounds .

## 2025-04-13 LAB
BASOPHILS # BLD AUTO: 0.1 K/UL — SIGNIFICANT CHANGE UP (ref 0–0.2)
BASOPHILS NFR BLD AUTO: 2.6 % — HIGH (ref 0–1)
EOSINOPHIL # BLD AUTO: 0.07 K/UL — SIGNIFICANT CHANGE UP (ref 0–0.7)
EOSINOPHIL NFR BLD AUTO: 1.8 % — SIGNIFICANT CHANGE UP (ref 0–8)
HCT VFR BLD CALC: 25.3 % — LOW (ref 37–47)
HGB BLD-MCNC: 8.4 G/DL — LOW (ref 12–16)
INR BLD: 1.03 RATIO — SIGNIFICANT CHANGE UP (ref 0.65–1.3)
LYMPHOCYTES # BLD AUTO: 0.34 K/UL — LOW (ref 1.2–3.4)
LYMPHOCYTES # BLD AUTO: 8.7 % — LOW (ref 20.5–51.1)
MCHC RBC-ENTMCNC: 29.4 PG — SIGNIFICANT CHANGE UP (ref 27–31)
MCHC RBC-ENTMCNC: 33.2 G/DL — SIGNIFICANT CHANGE UP (ref 32–37)
MCV RBC AUTO: 88.5 FL — SIGNIFICANT CHANGE UP (ref 81–99)
MONOCYTES # BLD AUTO: 0.41 K/UL — SIGNIFICANT CHANGE UP (ref 0.1–0.6)
MONOCYTES NFR BLD AUTO: 10.4 % — HIGH (ref 1.7–9.3)
NEUTROPHILS # BLD AUTO: 3.01 K/UL — SIGNIFICANT CHANGE UP (ref 1.4–6.5)
NEUTROPHILS NFR BLD AUTO: 76.5 % — HIGH (ref 42.2–75.2)
PLATELET # BLD AUTO: 306 K/UL — SIGNIFICANT CHANGE UP (ref 130–400)
PMV BLD: 11.3 FL — HIGH (ref 7.4–10.4)
PROTHROM AB SERPL-ACNC: 12.2 SEC — SIGNIFICANT CHANGE UP (ref 9.95–12.87)
RBC # BLD: 2.86 M/UL — LOW (ref 4.2–5.4)
RBC # FLD: 14.7 % — HIGH (ref 11.5–14.5)
WBC # BLD: 3.93 K/UL — LOW (ref 4.8–10.8)
WBC # FLD AUTO: 3.93 K/UL — LOW (ref 4.8–10.8)

## 2025-04-13 PROCEDURE — 99232 SBSQ HOSP IP/OBS MODERATE 35: CPT

## 2025-04-13 RX ADMIN — HYDROXYCHLOROQUINE SULFATE 200 MILLIGRAM(S): 200 TABLET, FILM COATED ORAL at 05:28

## 2025-04-13 RX ADMIN — HYDROXYCHLOROQUINE SULFATE 200 MILLIGRAM(S): 200 TABLET, FILM COATED ORAL at 16:58

## 2025-04-13 RX ADMIN — Medication 40 MILLIGRAM(S): at 05:28

## 2025-04-13 RX ADMIN — FLUOXETINE HYDROCHLORIDE 40 MILLIGRAM(S): 20 CAPSULE ORAL at 11:17

## 2025-04-13 RX ADMIN — Medication 1 APPLICATION(S): at 11:18

## 2025-04-13 RX ADMIN — ENOXAPARIN SODIUM 70 MILLIGRAM(S): 100 INJECTION SUBCUTANEOUS at 01:26

## 2025-04-13 RX ADMIN — ENOXAPARIN SODIUM 70 MILLIGRAM(S): 100 INJECTION SUBCUTANEOUS at 13:50

## 2025-04-13 RX ADMIN — Medication 5 MILLIGRAM(S): at 21:14

## 2025-04-13 NOTE — PHARMACOTHERAPY INTERVENTION NOTE - COMMENTS
64yFemale    Indication: APS, DVT/PE  INR Goal: 2-3  Home Dose: 7.5 mg daily  Bridge Therapy:enoxaparin Injectable 70 milliGRAM(s) every 12 hours      Current Medications:  chlorhexidine 2% Cloths 1 Application(s) Topical daily  enoxaparin Injectable 70 milliGRAM(s) SubCutaneous every 12 hours  FLUoxetine 40 milliGRAM(s) Oral daily  hydroxychloroquine 200 milliGRAM(s) Oral two times a day  influenza   Vaccine 0.5 milliLiter(s) IntraMuscular once  morphine  - Injectable 2 milliGRAM(s) IV Push every 4 hours PRN  ondansetron Injectable 4 milliGRAM(s) IV Push every 8 hours PRN  pantoprazole    Tablet 40 milliGRAM(s) Oral before breakfast      hemoglobin 8.4 g/dL (04-13-25 @ 07:34)    hematocrit 25.3 % (04-13-25 @ 07:34)    PLT: 306 K/uL (04-13-25 @ 07:34)    GFR:97 mL/min/1.73m2 (04-12-25 @ 08:19)      Drug Interactions:     INR trend  13.97 ratio (04-07-25 @ 16:13)  13.12 ratio (04-07-25 @ 20:28)  2.46 ratio (04-08-25 @ 00:28)  1.35 ratio (04-08-25 @ 05:51)  1.13 ratio (04-09-25 @ 06:14)  1.07 ratio (04-10-25 @ 07:19)  1.13 ratio (04-11-25 @ 06:30)  1.11 ratio (04-12-25 @ 08:19)  1.03 ratio (04-13-25 @ 07:34)      Warfarin administration history:  warfarin: 1 milliGRAM(s) (04-08-25 @ 22:49)  warfarin: 1 milliGRAM(s) (04-11-25 @ 21:28)  warfarin: 5 milliGRAM(s) (04-12-25 @ 21:08)        1. INR today is: 1.03              [ X ] below goal ----- This is likely due to restarting warfarin with low doses due to the concern of her INR being supratherapeutic on admission. Patient was taking acetaminophen and doxycycline that could potentially elevate her INR in the outpatient setting.                                            [   ] at goal                                            [   ] above goal ------ This is likely due to        2. Discuss with provider(s) listed                                            [  X  ] to consider  Warfarin 5 mg PO x 1 since ~20% reduction of her weekly home dose equates to approximately 6 mg once daily. Will attempt 5 mg for now to prevent any supratherapeutic INR                                           [     ] provider recommended warfarin                 PO x 1                                           [     ] provider will order warfarin                     PO x 1     3. Obtain INR tomorrow AM     64yFemale    Indication: APS, DVT/PE  INR Goal: 2-3  Home Dose: 7.5 mg daily  Bridge Therapy:enoxaparin Injectable 70 milliGRAM(s) every 12 hours      Current Medications:  chlorhexidine 2% Cloths 1 Application(s) Topical daily  enoxaparin Injectable 70 milliGRAM(s) SubCutaneous every 12 hours  FLUoxetine 40 milliGRAM(s) Oral daily  hydroxychloroquine 200 milliGRAM(s) Oral two times a day  influenza   Vaccine 0.5 milliLiter(s) IntraMuscular once  morphine  - Injectable 2 milliGRAM(s) IV Push every 4 hours PRN  ondansetron Injectable 4 milliGRAM(s) IV Push every 8 hours PRN  pantoprazole    Tablet 40 milliGRAM(s) Oral before breakfast      hemoglobin 8.4 g/dL (04-13-25 @ 07:34)    hematocrit 25.3 % (04-13-25 @ 07:34)    PLT: 306 K/uL (04-13-25 @ 07:34)    GFR:97 mL/min/1.73m2 (04-12-25 @ 08:19)      Drug Interactions:     INR trend  13.97 ratio (04-07-25 @ 16:13)  13.12 ratio (04-07-25 @ 20:28)  2.46 ratio (04-08-25 @ 00:28)  1.35 ratio (04-08-25 @ 05:51)  1.13 ratio (04-09-25 @ 06:14)  1.07 ratio (04-10-25 @ 07:19)  1.13 ratio (04-11-25 @ 06:30)  1.11 ratio (04-12-25 @ 08:19)  1.03 ratio (04-13-25 @ 07:34)      Warfarin administration history:  warfarin: 1 milliGRAM(s) (04-08-25 @ 22:49)  warfarin: 1 milliGRAM(s) (04-11-25 @ 21:28)  warfarin: 5 milliGRAM(s) (04-12-25 @ 21:08)        1. INR today is: 1.03              [ X ] below goal ----- This is likely due to restarting warfarin with low doses due to the concern of her INR being supratherapeutic on admission. Patient was taking acetaminophen and doxycycline that could potentially elevate her INR in the outpatient setting.                                            [   ] at goal                                            [   ] above goal ------ This is likely due to        2. Discuss with provider(s) listed                                            [  X  ] to consider  Warfarin 5 mg PO x 1 since ~20% reduction of her weekly home dose equates to approximately 6 mg once daily. Will attempt 5 mg for now to prevent any supratherapeutic INR                                           [     ] provider recommended warfarin                 PO x 1                                           [  X  ] provider will order warfarin 5 mg PO x 1     3. Obtain INR tomorrow AM     64yFemale    Indication: APS, DVT/PE  INR Goal: 2-3  Home Dose: 7.5 mg daily  Bridge Therapy:enoxaparin Injectable 70 milliGRAM(s) every 12 hours      Current Medications:  chlorhexidine 2% Cloths 1 Application(s) Topical daily  enoxaparin Injectable 70 milliGRAM(s) SubCutaneous every 12 hours  FLUoxetine 40 milliGRAM(s) Oral daily  hydroxychloroquine 200 milliGRAM(s) Oral two times a day  influenza   Vaccine 0.5 milliLiter(s) IntraMuscular once  morphine  - Injectable 2 milliGRAM(s) IV Push every 4 hours PRN  ondansetron Injectable 4 milliGRAM(s) IV Push every 8 hours PRN  pantoprazole    Tablet 40 milliGRAM(s) Oral before breakfast      hemoglobin 8.4 g/dL (04-13-25 @ 07:34)    hematocrit 25.3 % (04-13-25 @ 07:34)    PLT: 306 K/uL (04-13-25 @ 07:34)    GFR:97 mL/min/1.73m2 (04-12-25 @ 08:19)      Drug Interactions:     INR trend  13.97 ratio (04-07-25 @ 16:13)  13.12 ratio (04-07-25 @ 20:28)  2.46 ratio (04-08-25 @ 00:28)  1.35 ratio (04-08-25 @ 05:51)  1.13 ratio (04-09-25 @ 06:14)  1.07 ratio (04-10-25 @ 07:19)  1.13 ratio (04-11-25 @ 06:30)  1.11 ratio (04-12-25 @ 08:19)  1.03 ratio (04-13-25 @ 07:34)      Warfarin administration history:  warfarin: 1 milliGRAM(s) (04-08-25 @ 22:49)  warfarin: 1 milliGRAM(s) (04-11-25 @ 21:28)  warfarin: 5 milliGRAM(s) (04-12-25 @ 21:08)        1. INR today is: 1.03              [ X ] below goal ----- This is likely due to restarting warfarin with low doses due to the concern for supratherapeutic INR on admission. Patient was taking acetaminophen and doxycycline that could have potentially resulted in supratherapeutic INR in the outpatient setting.                                            [   ] at goal                                            [   ] above goal ------ This is likely due to        2. Discuss with provider(s) listed                                            [  X  ] to consider  Warfarin 5 mg PO x 1. If appropriate to consider a ~20% reduction of patient's weekly home dose, equating to approximately 6 mg once daily if 5 mg inadequate. Recommend to consider 5 mg for tonight                                         [     ] provider recommended warfarin                 PO x 1                                           [  X  ] provider will order warfarin 5 mg PO x 1     3. Obtain INR tomorrow AM

## 2025-04-13 NOTE — PROGRESS NOTE ADULT - ASSESSMENT
64-year-old female with a past medical history of SLE/ Antiphospholipid syndrome , DVT/PE on Coumadin (1990) , Hypertension ; Depression presented to the emergency department complaining of right lower back pain for 8 days.   She endorses that she started having right sided mid- lower back pain after she woke up 8 days back. Back pain was acute in onset; aggravated on movement. Initially did not take any medications for it. She went to the urgent care and started taking tylenol and Muscle Relaxant( Cyclobenzaprine) but did not help. 4 days ago she checked her INR and found to be 8 so stopped taking her Warfarin. INR on 03/30 was 13.2. Does not believe that she had any kind of trauma.   She also started having mery hematuria. Does not mention any burning urination ; increased frequency or incontinence.     #right paraspinal intramuscular hematoma - stable  #Supratherapeutic INR - resolved  - surgery eval appreciated - no acute intervention since no overlying skin breakdown, will resorb overtime  - monitor for signs of bleeding  - repeat cbc daily  - f/u daily INR  - 4/12 - stopped heparin drip and started Lovenox  - resume 5mg of coumadin   - keep active T&S   - 4/9 hgb stable but borderline - transfused 1 unit prbc     #Febrile 4/8- etiology? due to hematoma?  - 100.8, can give tylenol prn for fever   - if spikes again then send cultures     # PE/DVT On Coumadin  # SLE   # Antiphospholipid Antibody Syndrome   # Anemia from blood loss - stable  - dc heparin drip and start Lovenox  - cbc daily  - resume coumadin tonight  - continue hydroxychloroquine     #Hematuria/UTI - resolved  -s/p Rocephin for x3 days   -cultures neg     #Hyponatremia - resolved  - monitor as outpatient    # Hypertension with borderline BP  - holding antihypertensives   - monitor BP closely   - fall precautions  - oob with assistance at all times - discussed with patient daily   - mattress alarm on    Progress Note Handoff  Pending Consults: none  Pending Tests: labs  Pending Results: labs  Family Discussion: Discussed labs, meds, hemoglobin, hematoma, transfusion and overall plan of care with pt and medical staff. Remains acute. pfd for early next week - Monday/Tuesday  Disposition: Home__x___/SNF______/Other_____/Unknown at this time_____  Spent 55 min reviewing chart, speaking with patient/family and on coordinating patient care during interdisciplinary rounds .

## 2025-04-14 LAB
BASOPHILS # BLD AUTO: 0.05 K/UL — SIGNIFICANT CHANGE UP (ref 0–0.2)
BASOPHILS NFR BLD AUTO: 1 % — SIGNIFICANT CHANGE UP (ref 0–1)
EOSINOPHIL # BLD AUTO: 0.1 K/UL — SIGNIFICANT CHANGE UP (ref 0–0.7)
EOSINOPHIL NFR BLD AUTO: 2 % — SIGNIFICANT CHANGE UP (ref 0–8)
HCT VFR BLD CALC: 26.4 % — LOW (ref 37–47)
HGB BLD-MCNC: 8.7 G/DL — LOW (ref 12–16)
IMM GRANULOCYTES NFR BLD AUTO: 2.9 % — HIGH (ref 0.1–0.3)
INR BLD: 1.12 RATIO — SIGNIFICANT CHANGE UP (ref 0.65–1.3)
LYMPHOCYTES # BLD AUTO: 0.79 K/UL — LOW (ref 1.2–3.4)
LYMPHOCYTES # BLD AUTO: 16.1 % — LOW (ref 20.5–51.1)
MCHC RBC-ENTMCNC: 29.1 PG — SIGNIFICANT CHANGE UP (ref 27–31)
MCHC RBC-ENTMCNC: 33 G/DL — SIGNIFICANT CHANGE UP (ref 32–37)
MCV RBC AUTO: 88.3 FL — SIGNIFICANT CHANGE UP (ref 81–99)
MONOCYTES # BLD AUTO: 0.47 K/UL — SIGNIFICANT CHANGE UP (ref 0.1–0.6)
MONOCYTES NFR BLD AUTO: 9.6 % — HIGH (ref 1.7–9.3)
NEUTROPHILS # BLD AUTO: 3.36 K/UL — SIGNIFICANT CHANGE UP (ref 1.4–6.5)
NEUTROPHILS NFR BLD AUTO: 68.4 % — SIGNIFICANT CHANGE UP (ref 42.2–75.2)
NRBC BLD AUTO-RTO: 0 /100 WBCS — SIGNIFICANT CHANGE UP (ref 0–0)
PLATELET # BLD AUTO: 293 K/UL — SIGNIFICANT CHANGE UP (ref 130–400)
PMV BLD: 11.1 FL — HIGH (ref 7.4–10.4)
PROTHROM AB SERPL-ACNC: 13.2 SEC — HIGH (ref 9.95–12.87)
RBC # BLD: 2.99 M/UL — LOW (ref 4.2–5.4)
RBC # FLD: 15 % — HIGH (ref 11.5–14.5)
WBC # BLD: 4.91 K/UL — SIGNIFICANT CHANGE UP (ref 4.8–10.8)
WBC # FLD AUTO: 4.91 K/UL — SIGNIFICANT CHANGE UP (ref 4.8–10.8)

## 2025-04-14 PROCEDURE — 99232 SBSQ HOSP IP/OBS MODERATE 35: CPT

## 2025-04-14 RX ADMIN — Medication 1 APPLICATION(S): at 11:23

## 2025-04-14 RX ADMIN — Medication 7.5 MILLIGRAM(S): at 21:46

## 2025-04-14 RX ADMIN — HYDROXYCHLOROQUINE SULFATE 200 MILLIGRAM(S): 200 TABLET, FILM COATED ORAL at 05:18

## 2025-04-14 RX ADMIN — HYDROXYCHLOROQUINE SULFATE 200 MILLIGRAM(S): 200 TABLET, FILM COATED ORAL at 17:15

## 2025-04-14 RX ADMIN — FLUOXETINE HYDROCHLORIDE 40 MILLIGRAM(S): 20 CAPSULE ORAL at 11:23

## 2025-04-14 RX ADMIN — ENOXAPARIN SODIUM 70 MILLIGRAM(S): 100 INJECTION SUBCUTANEOUS at 01:28

## 2025-04-14 RX ADMIN — ENOXAPARIN SODIUM 70 MILLIGRAM(S): 100 INJECTION SUBCUTANEOUS at 13:00

## 2025-04-14 RX ADMIN — Medication 40 MILLIGRAM(S): at 05:18

## 2025-04-14 NOTE — PROGRESS NOTE ADULT - ASSESSMENT
64-year-old female with a past medical history of SLE/ Antiphospholipid syndrome , DVT/PE on Coumadin (1990) , Hypertension ; Depression presented to the emergency department complaining of right lower back pain for 8 days.   She endorses that she started having right sided mid- lower back pain after she woke up 8 days back. Back pain was acute in onset; aggravated on movement. Initially did not take any medications for it. She went to the urgent care and started taking tylenol and Muscle Relaxant( Cyclobenzaprine) but did not help. 4 days ago she checked her INR and found to be 8 so stopped taking her Warfarin. INR on 03/30 was 13.2. Does not believe that she had any kind of trauma.   She also started having mery hematuria. Does not mention any burning urination ; increased frequency or incontinence.     #right paraspinal intramuscular hematoma - stable  #Supratherapeutic INR - resolved  - surgery eval appreciated - no acute intervention since no overlying skin breakdown, will resorb overtime  - monitor for signs of bleeding  - repeat cbc daily  - f/u daily INR  - 4/12 - stopped heparin drip and started Lovenox  - resumed coumadin this weekend  - keep active T&S   - 4/9 hgb stable but borderline - transfused 1 unit prbc with improvement    # PE/DVT On Coumadin  # SLE   # Antiphospholipid Antibody Syndrome   # Anemia from blood loss - stable  - continue Lovenox  - cbc daily  - continue coumadin tonight  - continue hydroxychloroquine     #Hematuria/UTI - resolved  -s/p Rocephin for x3 days   -cultures neg     #Hyponatremia - resolved  - monitor as outpatient    # Hypertension with borderline BP  - holding antihypertensives   - monitor BP closely   - fall precautions  - oob with assistance at all times - discussed with patient daily - RN will ambulate with patient today   - mattress alarm on    Progress Note Handoff  Pending Consults: none  Pending Tests: labs  Pending Results: labs  Family Discussion: Discussed labs, meds, hemoglobin, hematoma, transfusion and overall plan of care with pt and medical staff. Remains acute. pfd for - Monday/Tuesday. Patient very eager to go home   Disposition: Home__x___/SNF______/Other_____/Unknown at this time_____  Spent 55 min reviewing chart, speaking with patient/family and on coordinating patient care during interdisciplinary rounds .

## 2025-04-14 NOTE — PROGRESS NOTE ADULT - ASSESSMENT
64-year-old female with a past medical history of SLE/ Antiphospholipid syndrome , DVT/PE on Coumadin (1990) , Hypertension ; Depression presented to the emergency department complaining of right lower back pain for 8 days.   She endorses that she started having right sided mid- lower back pain after she woke up 8 days back. Back pain was acute in onset; aggravated on movement. Initially did not take any medications for it. She went to the urgent care and started taking tylenol and Muscle Relaxant( Cyclobenzaprine) but did not help. 4 days ago she checked her INR and found to be 8 so stopped taking her Warfarin. INR on 03/30 was 13.2. Does not believe that she had any kind of trauma.   She also started having mery hematuria. Does not mention any burning urination ; increased frequency or incontinence.     #right paraspinal intramuscular hematoma - stable  #Supratherapeutic INR - resolved  - surgery eval appreciated - no acute intervention since no overlying skin breakdown, will resorb overtime  - monitor for signs of bleeding  - repeat cbc daily  - f/u daily INR  - off heparin drip, bridge with warfarin   - cbc daily, Hb stable   - coumadin tonight, ordered 7.5, f/u pharmacy   - keep active T&S     # PE/DVT On Coumadin  # SLE   # Antiphospholipid Antibody Syndrome   # Anemia from blood loss - stable- s/p 1 unit prbc  - off heparin drip, bridge with warfarin   - cbc daily, Hb stable   - coumadin tonight, ordered 7.5, f/u pharmacy   - continue hydroxychloroquine     #Febrile 4/8- etiology? due to hematoma?  - 100.8, can give tylenol prn for fever   - if spikes again then send cultures     #Hematuria/UTI - resolved  -s/p Rocephin for x3 days   -cultures neg     #Hyponatremia - resolved    # Hypertension with borderline BP  - holding antihypertensives   - monitor BP closely       # Misc  - DVT Prophylaxis: enoxaparin Injectable  - GI Prophylaxis: pantoprazole    Tablet 40 milliGRAM(s) Oral before breakfast  - Diet: Diet, DASH/TLC  - Activity: Activity - Ambulate as Tolerated  - Code Status: Full

## 2025-04-14 NOTE — PHARMACOTHERAPY INTERVENTION NOTE - COMMENTS
64yFemale    Indication: sle/aps dvt/pe 1990  INR Goal: 2-3  Home Dose: 7.5mg daily   Bridge Therapy:enoxaparin Injectable 70 milliGRAM(s) every 12 hours      Current Medications:  chlorhexidine 2% Cloths 1 Application(s) Topical daily  enoxaparin Injectable 70 milliGRAM(s) SubCutaneous every 12 hours  FLUoxetine 40 milliGRAM(s) Oral daily  hydroxychloroquine 200 milliGRAM(s) Oral two times a day  influenza   Vaccine 0.5 milliLiter(s) IntraMuscular once  morphine  - Injectable 2 milliGRAM(s) IV Push every 4 hours PRN  ondansetron Injectable 4 milliGRAM(s) IV Push every 8 hours PRN  pantoprazole    Tablet 40 milliGRAM(s) Oral before breakfast  warfarin 7.5 milliGRAM(s) Oral once      hemoglobin 8.7 g/dL (04-14-25 @ 07:18)    hematocrit 26.4 % (04-14-25 @ 07:18)    PLT: 293 K/uL (04-14-25 @ 07:18)    GFR:97 mL/min/1.73m2 (04-12-25 @ 08:19)      Drug Interactions:     INR trend  13.97 ratio (04-07-25 @ 16:13)  13.12 ratio (04-07-25 @ 20:28)  2.46 ratio (04-08-25 @ 00:28)  1.35 ratio (04-08-25 @ 05:51)  1.13 ratio (04-09-25 @ 06:14)  1.07 ratio (04-10-25 @ 07:19)  1.13 ratio (04-11-25 @ 06:30)  1.11 ratio (04-12-25 @ 08:19)  1.03 ratio (04-13-25 @ 07:34)  1.12 ratio (04-14-25 @ 07:18)      Warfarin administration history:  warfarin: 1 milliGRAM(s) (04-08-25 @ 22:49)  warfarin: 1 milliGRAM(s) (04-11-25 @ 21:28)  warfarin: 5 milliGRAM(s) (04-12-25 @ 21:08)  warfarin: 5 milliGRAM(s) (04-13-25 @ 21:14)        1. INR today is:        1.12       [ x  ] below goal ----- This is likely due to warfarin being held and restarting on low doses. Patient also received Vit K on 4/7 due to supratherapeutic INR.                                             [   ] at goal                                            [   ] above goal ------ This is likely due to        2. Discuss with provider(s) listed                                            [     ] to consider  Warfarin           PO x 1                                            [   x  ] provider recommended warfarin        7.5mg         PO x 1                                           [     ] provider will order warfarin                     PO x 1     3. Obtain INR tomorrow AM

## 2025-04-15 ENCOUNTER — APPOINTMENT (OUTPATIENT)
Dept: CARDIOLOGY | Facility: CLINIC | Age: 65
End: 2025-04-15

## 2025-04-15 LAB
APTT BLD: 37.7 SEC — SIGNIFICANT CHANGE UP (ref 27–39.2)
BASOPHILS # BLD AUTO: 0.04 K/UL — SIGNIFICANT CHANGE UP (ref 0–0.2)
BASOPHILS NFR BLD AUTO: 0.9 % — SIGNIFICANT CHANGE UP (ref 0–1)
EOSINOPHIL # BLD AUTO: 0.08 K/UL — SIGNIFICANT CHANGE UP (ref 0–0.7)
EOSINOPHIL NFR BLD AUTO: 1.7 % — SIGNIFICANT CHANGE UP (ref 0–8)
HCT VFR BLD CALC: 27.1 % — LOW (ref 37–47)
HGB BLD-MCNC: 8.8 G/DL — LOW (ref 12–16)
IMM GRANULOCYTES NFR BLD AUTO: 3.7 % — HIGH (ref 0.1–0.3)
INR BLD: 1.24 RATIO — SIGNIFICANT CHANGE UP (ref 0.65–1.3)
LYMPHOCYTES # BLD AUTO: 0.73 K/UL — LOW (ref 1.2–3.4)
LYMPHOCYTES # BLD AUTO: 15.9 % — LOW (ref 20.5–51.1)
MCHC RBC-ENTMCNC: 29.2 PG — SIGNIFICANT CHANGE UP (ref 27–31)
MCHC RBC-ENTMCNC: 32.5 G/DL — SIGNIFICANT CHANGE UP (ref 32–37)
MCV RBC AUTO: 90 FL — SIGNIFICANT CHANGE UP (ref 81–99)
MONOCYTES # BLD AUTO: 0.47 K/UL — SIGNIFICANT CHANGE UP (ref 0.1–0.6)
MONOCYTES NFR BLD AUTO: 10.3 % — HIGH (ref 1.7–9.3)
NEUTROPHILS # BLD AUTO: 3.09 K/UL — SIGNIFICANT CHANGE UP (ref 1.4–6.5)
NEUTROPHILS NFR BLD AUTO: 67.5 % — SIGNIFICANT CHANGE UP (ref 42.2–75.2)
NRBC BLD AUTO-RTO: 0 /100 WBCS — SIGNIFICANT CHANGE UP (ref 0–0)
PLATELET # BLD AUTO: 287 K/UL — SIGNIFICANT CHANGE UP (ref 130–400)
PMV BLD: 11.5 FL — HIGH (ref 7.4–10.4)
PROTHROM AB SERPL-ACNC: 14.7 SEC — HIGH (ref 9.95–12.87)
RBC # BLD: 3.01 M/UL — LOW (ref 4.2–5.4)
RBC # FLD: 15.4 % — HIGH (ref 11.5–14.5)
WBC # BLD: 4.58 K/UL — LOW (ref 4.8–10.8)
WBC # FLD AUTO: 4.58 K/UL — LOW (ref 4.8–10.8)

## 2025-04-15 PROCEDURE — 99232 SBSQ HOSP IP/OBS MODERATE 35: CPT

## 2025-04-15 PROCEDURE — 74176 CT ABD & PELVIS W/O CONTRAST: CPT | Mod: 26

## 2025-04-15 RX ADMIN — ENOXAPARIN SODIUM 70 MILLIGRAM(S): 100 INJECTION SUBCUTANEOUS at 01:52

## 2025-04-15 RX ADMIN — Medication 1 APPLICATION(S): at 11:03

## 2025-04-15 RX ADMIN — HYDROXYCHLOROQUINE SULFATE 200 MILLIGRAM(S): 200 TABLET, FILM COATED ORAL at 06:11

## 2025-04-15 RX ADMIN — Medication 40 MILLIGRAM(S): at 06:11

## 2025-04-15 RX ADMIN — Medication 7.5 MILLIGRAM(S): at 21:42

## 2025-04-15 RX ADMIN — ENOXAPARIN SODIUM 70 MILLIGRAM(S): 100 INJECTION SUBCUTANEOUS at 13:05

## 2025-04-15 RX ADMIN — HYDROXYCHLOROQUINE SULFATE 200 MILLIGRAM(S): 200 TABLET, FILM COATED ORAL at 17:28

## 2025-04-15 RX ADMIN — FLUOXETINE HYDROCHLORIDE 40 MILLIGRAM(S): 20 CAPSULE ORAL at 11:02

## 2025-04-15 NOTE — PHARMACOTHERAPY INTERVENTION NOTE - COMMENTS
64yFemale    Indication: PAS/SLE, DVT/PE   INR Goal: 2-3  Home Dose: 7.5mg daily  Bridge Therapy:enoxaparin Injectable 70 milliGRAM(s) every 12 hours      Current Medications:  chlorhexidine 2% Cloths 1 Application(s) Topical daily  enoxaparin Injectable 70 milliGRAM(s) SubCutaneous every 12 hours  FLUoxetine 40 milliGRAM(s) Oral daily  hydroxychloroquine 200 milliGRAM(s) Oral two times a day  influenza   Vaccine 0.5 milliLiter(s) IntraMuscular once  morphine  - Injectable 2 milliGRAM(s) IV Push every 4 hours PRN  ondansetron Injectable 4 milliGRAM(s) IV Push every 8 hours PRN  pantoprazole    Tablet 40 milliGRAM(s) Oral before breakfast  warfarin 7.5 milliGRAM(s) Oral once      hemoglobin 8.8 g/dL (04-15-25 @ 04:30)    hematocrit 27.1 % (04-15-25 @ 04:30)    PLT: 287 K/uL (04-15-25 @ 04:30)    GFR:97 mL/min/1.73m2 (04-12-25 @ 08:19)      Drug Interactions:     INR trend  1.13 ratio (04-09-25 @ 06:14)  1.07 ratio (04-10-25 @ 07:19)  1.13 ratio (04-11-25 @ 06:30)  1.11 ratio (04-12-25 @ 08:19)  1.03 ratio (04-13-25 @ 07:34)  1.12 ratio (04-14-25 @ 07:18)  1.24 ratio (04-15-25 @ 04:30)      Warfarin administration history:  warfarin: 1 milliGRAM(s) (04-08-25 @ 22:49)  warfarin: 1 milliGRAM(s) (04-11-25 @ 21:28)  warfarin: 5 milliGRAM(s) (04-12-25 @ 21:08)  warfarin: 5 milliGRAM(s) (04-13-25 @ 21:14)  warfarin: 7.5 milliGRAM(s) (04-14-25 @ 21:46)        1. INR today is:               [ x ] below goal ----- This is likely due to held warfarin (restarted on 04/11)                                            [   ] at goal                                            [   ] above goal         2. Recommend Warfarin     7.5mg      PO x 1 (home dose)  3. Obtain INR tomorrow AM

## 2025-04-15 NOTE — PROGRESS NOTE ADULT - ASSESSMENT
64-year-old female with a past medical history of SLE/ Antiphospholipid syndrome , DVT/PE on Coumadin (1990) , Hypertension ; Depression presented to the emergency department complaining of right lower back pain for 8 days.   She endorses that she started having right sided mid- lower back pain after she woke up 8 days back. Back pain was acute in onset; aggravated on movement. Initially did not take any medications for it. She went to the urgent care and started taking tylenol and Muscle Relaxant( Cyclobenzaprine) but did not help. 4 days ago she checked her INR and found to be 8 so stopped taking her Warfarin. INR on 03/30 was 13.2. Does not believe that she had any kind of trauma.   She also started having mery hematuria. Does not mention any burning urination ; increased frequency or incontinence.     #right paraspinal intramuscular hematoma - stable  repeat ct once inr therapeutic     #Supratherapeutic INR - resolved    # PE/DVT On Coumadin    #Drug monitoring of high risk medication , potential for drug drug reaction , requiring close monitoring   INR: 1.24 ratio    continue with coumadin , potential for bleeding will monitor     # SLE     # Antiphospholipid Antibody Syndrome  continue hydroxychloroquine     # Anemia from blood loss - stable    #Hematuria/UTI - resolved s/p Rocephin for x3 days     #Hyponatremia - resolved    # Hypertension with borderline BP  BP: 102/62 (15 Apr 2025 07:00) (90/53 - 102/62)  controlled     PROGRESS NOTE HANDOFF    Pending: continue with dosing coumadin , repeat ct to ensure hematoma stable improved     Family discussion: patient verbalized understanding and agreeable to plan of care     Disposition: Home

## 2025-04-16 ENCOUNTER — TRANSCRIPTION ENCOUNTER (OUTPATIENT)
Age: 65
End: 2025-04-16

## 2025-04-16 VITALS
HEART RATE: 85 BPM | DIASTOLIC BLOOD PRESSURE: 70 MMHG | TEMPERATURE: 99 F | OXYGEN SATURATION: 98 % | SYSTOLIC BLOOD PRESSURE: 118 MMHG | RESPIRATION RATE: 18 BRPM

## 2025-04-16 LAB
APTT BLD: 40 SEC — HIGH (ref 27–39.2)
HCT VFR BLD CALC: 28.4 % — LOW (ref 37–47)
HGB BLD-MCNC: 9.3 G/DL — LOW (ref 12–16)
INR BLD: 1.4 RATIO — HIGH (ref 0.65–1.3)
MCHC RBC-ENTMCNC: 29.3 PG — SIGNIFICANT CHANGE UP (ref 27–31)
MCHC RBC-ENTMCNC: 32.7 G/DL — SIGNIFICANT CHANGE UP (ref 32–37)
MCV RBC AUTO: 89.6 FL — SIGNIFICANT CHANGE UP (ref 81–99)
NRBC BLD AUTO-RTO: 0 /100 WBCS — SIGNIFICANT CHANGE UP (ref 0–0)
PLATELET # BLD AUTO: 280 K/UL — SIGNIFICANT CHANGE UP (ref 130–400)
PMV BLD: 11.5 FL — HIGH (ref 7.4–10.4)
PROTHROM AB SERPL-ACNC: 16.6 SEC — HIGH (ref 9.95–12.87)
RBC # BLD: 3.17 M/UL — LOW (ref 4.2–5.4)
RBC # FLD: 15.5 % — HIGH (ref 11.5–14.5)
WBC # BLD: 3.68 K/UL — LOW (ref 4.8–10.8)
WBC # FLD AUTO: 3.68 K/UL — LOW (ref 4.8–10.8)

## 2025-04-16 PROCEDURE — 99232 SBSQ HOSP IP/OBS MODERATE 35: CPT

## 2025-04-16 RX ORDER — ENOXAPARIN SODIUM 100 MG/ML
80 INJECTION SUBCUTANEOUS
Qty: 10 | Refills: 0
Start: 2025-04-16 | End: 2025-04-20

## 2025-04-16 RX ADMIN — Medication 40 MILLIGRAM(S): at 06:07

## 2025-04-16 RX ADMIN — ENOXAPARIN SODIUM 70 MILLIGRAM(S): 100 INJECTION SUBCUTANEOUS at 02:32

## 2025-04-16 RX ADMIN — ENOXAPARIN SODIUM 70 MILLIGRAM(S): 100 INJECTION SUBCUTANEOUS at 13:51

## 2025-04-16 RX ADMIN — HYDROXYCHLOROQUINE SULFATE 200 MILLIGRAM(S): 200 TABLET, FILM COATED ORAL at 18:55

## 2025-04-16 RX ADMIN — HYDROXYCHLOROQUINE SULFATE 200 MILLIGRAM(S): 200 TABLET, FILM COATED ORAL at 06:07

## 2025-04-16 RX ADMIN — Medication 1 APPLICATION(S): at 13:03

## 2025-04-16 RX ADMIN — FLUOXETINE HYDROCHLORIDE 40 MILLIGRAM(S): 20 CAPSULE ORAL at 12:53

## 2025-04-16 NOTE — PROGRESS NOTE ADULT - PROVIDER SPECIALTY LIST ADULT
Hospitalist
Internal Medicine
Surgery
Hospitalist
Hospitalist
Internal Medicine
Internal Medicine
Hospitalist
Internal Medicine
Internal Medicine

## 2025-04-16 NOTE — PROGRESS NOTE ADULT - ASSESSMENT
64-year-old female with a past medical history of SLE/ Antiphospholipid syndrome , DVT/PE on Coumadin (1990) , Hypertension ; Depression presented to the emergency department complaining of right lower back pain for 8 days.   She endorses that she started having right sided mid- lower back pain after she woke up 8 days back. Back pain was acute in onset; aggravated on movement. Initially did not take any medications for it. She went to the urgent care and started taking tylenol and Muscle Relaxant( Cyclobenzaprine) but did not help. 4 days ago she checked her INR and found to be 8 so stopped taking her Warfarin. INR on 03/30 was 13.2. Does not believe that she had any kind of trauma.   She also started having mery hematuria. Does not mention any burning urination ; increased frequency or incontinence.     #right paraspinal intramuscular hematoma - stable  #Supratherapeutic INR - resolved  - surgery eval appreciated - no acute intervention since no overlying skin breakdown, will resorb overtime  - monitor for signs of bleeding  - repeat cbc daily  - f/u daily INR  - lovenox x warfarin bridge   - cbc daily, Hb stable   - keep active T&S   - INR 1.4 today, might dc on lovenox bridge     # PE/DVT On Coumadin  # SLE   # Antiphospholipid Antibody Syndrome   # Anemia from blood loss - stable- s/p 1 unit prbc  - lovenox x warfarin bridge   - INR 1.4 today, might dc on lovenox bridge   - continue hydroxychloroquine     #Febrile 4/8- etiology? due to hematoma?  - 100.8, can give tylenol prn for fever   - if spikes again then send cultures     #Hematuria/UTI - resolved  -s/p Rocephin for x3 days   -cultures neg     #Hyponatremia - resolved    # Hypertension with borderline BP  - holding antihypertensives   - monitor BP closely       # Misc  - DVT Prophylaxis: enoxaparin Injectable  - GI Prophylaxis: pantoprazole    Tablet 40 milliGRAM(s) Oral before breakfast  - Diet: Diet, DASH/TLC  - Activity: Activity - Ambulate as Tolerated  - Code Status: Full

## 2025-04-16 NOTE — PROGRESS NOTE ADULT - ASSESSMENT
64-year-old female with a past medical history of SLE/ Antiphospholipid syndrome , DVT/PE on Coumadin (1990) , Hypertension ; Depression presented to the emergency department complaining of right lower back pain for 8 days.   She endorses that she started having right sided mid- lower back pain after she woke up 8 days back. Back pain was acute in onset; aggravated on movement. Initially did not take any medications for it. She went to the urgent care and started taking tylenol and Muscle Relaxant( Cyclobenzaprine) but did not help. 4 days ago she checked her INR and found to be 8 so stopped taking her Warfarin. INR on 03/30 was 13.2. Does not believe that she had any kind of trauma.   She also started having mery hematuria. Does not mention any burning urination ; increased frequency or incontinence.     #right paraspinal intramuscular hematoma - stable  CT repeat shows stability of hematoma     #Supratherapeutic INR - resolved    # PE/DVT On Coumadin    #Drug monitoring of high risk medication , potential for drug drug reaction , requiring close monitoring   INR: 1.40ratio    continue with coumadin , potential for bleeding will monitor     # SLE     # Antiphospholipid Antibody Syndrome  continue hydroxychloroquine     # Anemia from blood loss - stable    #Hematuria/UTI - resolved s/p Rocephin for x3 days     #Hyponatremia - resolved    # Hypertension with borderline BP  BP: 111/71 (16 Apr 2025 07:00) (107/66 - 111/71)  controlled     PROGRESS NOTE HANDOFF    Pending: continue with dosing coumadin , will begin dc planning   Family discussion: patient verbalized understanding and agreeable to plan of care     Disposition: Home

## 2025-04-16 NOTE — PHARMACOTHERAPY INTERVENTION NOTE - INTERVENTION CATEGORIES
Quality 226: Preventive Care And Screening: Tobacco Use: Screening And Cessation Intervention: Patient screened for tobacco use and is an ex/non-smoker
Therapy
Quality 111:Pneumonia Vaccination Status For Older Adults: Patient received any pneumococcal conjugate or polysaccharide vaccine on or after their 60th birthday and before the end of the measurement period
Therapy
Therapy
Quality 130: Documentation Of Current Medications In The Medical Record: Current Medications Documented
Therapy
Quality 47: Advance Care Plan: Advance Care Planning discussed and documented; advance care plan or surrogate decision maker documented in the medical record.
Detail Level: Detailed
Therapy
Therapy
Quality 110: Preventive Care And Screening: Influenza Immunization: Influenza Immunization Administered during Influenza season

## 2025-04-16 NOTE — DISCHARGE NOTE NURSING/CASE MANAGEMENT/SOCIAL WORK - FINANCIAL ASSISTANCE
[facility] provides services at a reduced cost to those who are determined to be eligible through [facility]’s financial assistance program. Information regarding [facility]’s financial assistance program can be found by going to [email or web address] or by calling [phone].

## 2025-04-16 NOTE — DISCHARGE NOTE NURSING/CASE MANAGEMENT/SOCIAL WORK - PATIENT PORTAL LINK FT
You can access the FollowMyHealth Patient Portal offered by [facility] by registering at the following website: [email or web address]. By joining [facility]’s FollowMyHealth portal, you will also be able to view your health information using other applications (apps) compatible with our system.

## 2025-04-16 NOTE — PROGRESS NOTE ADULT - SUBJECTIVE AND OBJECTIVE BOX
HENRY SANDERS  64y  [id number]      Patient is a 64y old  Female who presents with a chief complaint of     My note supersedes the resident's note      INTERVAL HPI/OVERNIGHT EVENTS:        REVIEW OF SYSTEMS:  CONSTITUTIONAL: No fever, weight loss, or fatigue  EYES: No eye pain, visual disturbances, or discharge  ENMT:  No difficulty hearing, tinnitus, vertigo; No sinus or throat pain  NECK: No pain or stiffness  BREASTS: No pain, masses, or nipple discharge  RESPIRATORY: No cough, wheezing, chills or hemoptysis; No shortness of breath  CARDIOVASCULAR: No chest pain, palpitations, dizziness, or leg swelling  GASTROINTESTINAL: No abdominal or epigastric pain. No nausea, vomiting, or hematemesis; No diarrhea or constipation. No melena or hematochezia.  GENITOURINARY: No dysuria, frequency, hematuria, or incontinence  NEUROLOGICAL: No headaches, memory loss, loss of strength, numbness, or tremors  SKIN: No itching, burning, rashes, or lesions   LYMPH NODES: No enlarged glands  ENDOCRINE: No heat or cold intolerance; No hair loss  MUSCULOSKELETAL: No joint pain or swelling; No muscle, back, or extremity pain  PSYCHIATRIC: No depression, anxiety, mood swings, or difficulty sleeping  HEME/LYMPH: No easy bruising, or bleeding gums  ALLERY AND IMMUNOLOGIC: No hives or eczema  FAMILY HISTORY:    T(C): 37.1 (04-08-25 @ 07:57), Max: 37.3 (04-08-25 @ 02:55)  HR: 82 (04-08-25 @ 07:57) (74 - 84)  BP: 118/74 (04-08-25 @ 07:57) (91/61 - 121/79)  RR: 17 (04-08-25 @ 07:57) (17 - 18)  SpO2: 100% (04-08-25 @ 07:57) (97% - 100%)  Wt(kg): --Vital Signs Last 24 Hrs  T(C): 37.1 (08 Apr 2025 07:57), Max: 37.3 (08 Apr 2025 02:55)  T(F): 98.7 (08 Apr 2025 07:57), Max: 99.2 (08 Apr 2025 02:55)  HR: 82 (08 Apr 2025 07:57) (74 - 84)  BP: 118/74 (08 Apr 2025 07:57) (91/61 - 121/79)  BP(mean): 93 (08 Apr 2025 02:55) (93 - 93)  RR: 17 (08 Apr 2025 07:57) (17 - 18)  SpO2: 100% (08 Apr 2025 07:57) (97% - 100%)    Parameters below as of 08 Apr 2025 07:57  Patient On (Oxygen Delivery Method): room air        PHYSICAL EXAM:  GENERAL: NAD,   HEAD:  Atraumatic, Normocephalic  EYES: EOMI, PERRLA, conjunctiva and sclera clear  ENMT: No tonsillar erythema, exudates, or enlargement; Moist mucous membranes, Good dentition, No lesions  NECK: Supple, No JVD, Normal thyroid  NERVOUS SYSTEM:  Alert & Oriented X3, Good concentration; Motor Strength 5/5 B/L upper and lower extremities; DTRs 2+ intact and symmetric  PULM: Clear to auscultation bilaterally  CARDIAC: Regular rate and rhythm; No murmurs, rubs, or gallops  GI: Soft, Nontender, Nondistended; Bowel sounds present  EXTREMITIES:  2+ Peripheral Pulses, No clubbing, cyanosis, or edema  LYMPH: No lymphadenopathy noted  SKIN: No rashes or lesions    Consultant(s) Notes Reviewed:  [x ] YES  [ ] NO  Care Discussed with Consultants/Other Providers [ x] YES  [ ] NO    LABS:                            8.3    7.27  )-----------( 241      ( 08 Apr 2025 05:51 )             24.1   04-08    134[L]  |  99  |  15  ----------------------------<  110[H]  4.3   |  25  |  0.7    Ca    8.3[L]      08 Apr 2025 05:51  Phos  3.0     04-07  Mg     1.9     04-07    TPro  6.6  /  Alb  3.5  /  TBili  0.7  /  DBili  x   /  AST  53[H]  /  ALT  28  /  AlkPhos  49  04-08            Urinalysis with Rflx Culture (collected 07 Apr 2025 19:33)      cefTRIAXone   IVPB 1000 milliGRAM(s) IV Intermittent every 24 hours  FLUoxetine 40 milliGRAM(s) Oral daily  heparin   Injectable 6000 Unit(s) IV Push every 6 hours PRN  heparin   Injectable 3000 Unit(s) IV Push every 6 hours PRN  heparin  Infusion.  Unit(s)/Hr IV Continuous <Continuous>  hydroxychloroquine 200 milliGRAM(s) Oral two times a day  influenza   Vaccine 0.5 milliLiter(s) IntraMuscular once  lactated ringers. 1000 milliLiter(s) IV Continuous <Continuous>  morphine  - Injectable 2 milliGRAM(s) IV Push every 4 hours PRN  ondansetron Injectable 4 milliGRAM(s) IV Push every 8 hours PRN  pantoprazole    Tablet 40 milliGRAM(s) Oral before breakfast      HEALTH ISSUES - PROBLEM Dx:          Case Discussed with House Staff   50 minutes spent on total time on encounter , this excludes teaching   Spectra [phone]  
SUBJECTIVE/OVERNIGHT EVENTS  Today is hospital day 1d. This morning patient was seen and examined at bedside, resting comfortably in bed. No acute or major events overnight.      MEDICATIONS  STANDING MEDICATIONS  cefTRIAXone   IVPB 1000 milliGRAM(s) IV Intermittent every 24 hours  FLUoxetine 40 milliGRAM(s) Oral daily  heparin  Infusion.  Unit(s)/Hr IV Continuous <Continuous>  hydroxychloroquine 200 milliGRAM(s) Oral two times a day  influenza   Vaccine 0.5 milliLiter(s) IntraMuscular once  lactated ringers. 1000 milliLiter(s) IV Continuous <Continuous>  pantoprazole    Tablet 40 milliGRAM(s) Oral before breakfast    PRN MEDICATIONS  heparin   Injectable 6000 Unit(s) IV Push every 6 hours PRN  heparin   Injectable 3000 Unit(s) IV Push every 6 hours PRN  morphine  - Injectable 2 milliGRAM(s) IV Push every 4 hours PRN  ondansetron Injectable 4 milliGRAM(s) IV Push every 8 hours PRN    VITALS  T(F): 98.7 (04-08-25 @ 07:57), Max: 99.2 (04-08-25 @ 02:55)  HR: 82 (04-08-25 @ 07:57) (74 - 84)  BP: 118/74 (04-08-25 @ 07:57) (91/61 - 121/79)  RR: 17 (04-08-25 @ 07:57) (17 - 18)  SpO2: 100% (04-08-25 @ 07:57) (97% - 100%)    PHYSICAL EXAM  GENERAL  ( X ) NAD, lying in bed comfortably     (  ) obtunded     (  ) lethargic     (  ) somnolent    HEAD  (X  ) Atraumatic     (  ) hematoma     (  ) laceration (specify location:       )     NECK  (X  ) Supple     (  ) neck stiffness     (  ) nuchal rigidity     (  )  no JVD     (  ) JVD present ( -- cm)    HEART  Rate -->  (  ) normal rate    (  ) bradycardic    (  ) tachycardic  Rhythm -->  (  ) regular    (  ) regularly irregular    (  ) irregularly irregular  Murmurs -->  ( X ) normal s1/s2    (  ) systolic murmur    (  ) diastolic murmur    (  ) continuous murmur     (  ) S3 present    (  ) S4 present    LUNGS  (  )Unlabored respirations     (  ) tachypnea  (X  ) B/L air entry     (  ) decreased breath sounds in:  (location     )    (  ) no adventitious sound     (  ) crackles     (  ) wheezing      (  ) rhonchi      (specify location:       )  (  ) chest wall tenderness (specify location:       )    ABDOMEN  (  ) Soft     (  ) tense   |   (  ) nondistended     (  ) distended   |   (  ) +BS     (  ) hypoactive bowel sounds     (  ) hyperactive bowel sounds  (  ) nontender     (  ) RUQ tenderness     (  ) RLQ tenderness     (  ) LLQ tenderness     (  ) epigastric tenderness     (  ) diffuse tenderness  (  ) Splenomegaly      (  ) Hepatomegaly      (  ) Jaundice     (  ) ecchymosis   ( X ) large right sided swelling over the back, tender on deep palpation associated with eccymosis)    EXTREMITIES  (  X) Normal     (  ) Rash     (  ) ecchymosis     (  ) varicose veins      (  ) pitting edema     (  ) non-pitting edema   (  ) ulceration     (  ) gangrene:     (location:     )    NERVOUS SYSTEM  ( X ) A&Ox3     (  ) confused     (  ) lethargic  CN II-XII:     (  ) Intact     (  ) focal deficits  (Specify:     )   Upper extremities:     (  ) strength X/5     (  ) focal deficit (specify:    )  Lower extremities:     (  ) strength  X/5    (  ) focal deficit (specify:    )    SKIN  (  ) No rashes or lesions     (  ) maculopapular rash     (  ) pustules     (  ) vesicles     (  ) ulcer     (  ) ecchymosis     (specify location:     )    (  ) Indwelling Street Catheter   Date insterted:    Reason (  ) Critical illness     (  ) urinary retention    (  ) Accurate Ins/Outs Monitoring     (  ) CMO patient    (  ) Central Line  Date inserted:  Location: (  ) Right IJ   (  ) Left IJ   (  ) Right Fem   (  ) Left Fem    (  ) SPC  (  ) pigtail  (  ) PEG tube  (  ) colostomy  (  ) jejunostomy  (  ) U-Dall    LABS             8.3    7.27  )-----------( 241      ( 04-08-25 @ 05:51 )             24.1     134  |  99  |  15  -------------------------<  110   04-08-25 @ 05:51  4.3  |  25  |  0.7    Ca      8.3     04-08-25 @ 05:51  Phos   3.0     04-07-25 @ 16:13  Mg     1.9     04-07-25 @ 16:13    TPro  6.6  /  Alb  3.5  /  TBili  0.7  /  DBili  x   /  AST  53  /  ALT  28  /  AlkPhos  49  /  GGT  x     04-08-25 @ 05:51    PT/INR - ( 04-08-25 @ 05:51 )   PT: 16.00 sec[H];   INR: 1.35 ratio[H]  PTT - ( 04-08-25 @ 05:51 )  PTT:29.2 sec    Pro-Brain Natriuretic Peptide: 150 pg/mL (04-07-25 @ 16:13)    Urinalysis Basic - ( 08 Apr 2025 05:51 )    Color: x / Appearance: x / SG: x / pH: x  Gluc: 110 mg/dL / Ketone: x  / Bili: x / Urobili: x   Blood: x / Protein: x / Nitrite: x   Leuk Esterase: x / RBC: x / WBC x   Sq Epi: x / Non Sq Epi: x / Bacteria: x          Urinalysis with Rflx Culture (collected 07 Apr 2025 19:33)      
SUBJECTIVE/OVERNIGHT EVENTS  Today is hospital day 7d. This morning patient was seen and examined at bedside, resting comfortably in bed. No acute or major events overnight.      MEDICATIONS  STANDING MEDICATIONS  chlorhexidine 2% Cloths 1 Application(s) Topical daily  enoxaparin Injectable 70 milliGRAM(s) SubCutaneous every 12 hours  FLUoxetine 40 milliGRAM(s) Oral daily  hydroxychloroquine 200 milliGRAM(s) Oral two times a day  influenza   Vaccine 0.5 milliLiter(s) IntraMuscular once  pantoprazole    Tablet 40 milliGRAM(s) Oral before breakfast  warfarin 7.5 milliGRAM(s) Oral once    PRN MEDICATIONS  morphine  - Injectable 2 milliGRAM(s) IV Push every 4 hours PRN  ondansetron Injectable 4 milliGRAM(s) IV Push every 8 hours PRN    VITALS  T(F): 98.2 (04-14-25 @ 08:00), Max: 98.6 (04-14-25 @ 00:00)  HR: 80 (04-14-25 @ 08:00) (72 - 80)  BP: 101/63 (04-14-25 @ 08:00) (87/48 - 108/68)  RR: 17 (04-14-25 @ 08:00) (17 - 18)  SpO2: 100% (04-14-25 @ 08:00) (95% - 100%)    PHYSICAL EXAM  GENERAL  ( X ) NAD, lying in bed comfortably     (  ) obtunded     (  ) lethargic     (  ) somnolent    HEAD  (X  ) Atraumatic     (  ) hematoma     (  ) laceration (specify location:       )     NECK  ( X ) Supple     (  ) neck stiffness     (  ) nuchal rigidity     (  )  no JVD     (  ) JVD present ( -- cm)    HEART  Rate -->  (  ) normal rate    (  ) bradycardic    (  ) tachycardic  Rhythm -->  (  ) regular    (  ) regularly irregular    (  ) irregularly irregular  Murmurs -->  (X  ) normal s1/s2    (  ) systolic murmur    (  ) diastolic murmur    (  ) continuous murmur     (  ) S3 present    (  ) S4 present    LUNGS  (  )Unlabored respirations     (  ) tachypnea  ( X ) B/L air entry     (  ) decreased breath sounds in:  (location     )    (  ) no adventitious sound     (  ) crackles     (  ) wheezing      (  ) rhonchi      (specify location:       )  (  ) chest wall tenderness (specify location:       )    ABDOMEN  (X  ) Soft     (  ) tense   |   ( X ) nondistended     (  ) distended   |   ( X ) +BS     (  ) hypoactive bowel sounds     (  ) hyperactive bowel sounds  (  ) nontender     (  ) RUQ tenderness     (  ) RLQ tenderness     (  ) LLQ tenderness     (  ) epigastric tenderness     (  ) diffuse tenderness  (  ) Splenomegaly      (  ) Hepatomegaly      (  ) Jaundice     (  ) ecchymosis  ( X ) RT  Paraspinal swelling-resolving    EXTREMITIES  ( X ) Normal     (  ) Rash     (  ) ecchymosis     (  ) varicose veins      (  ) pitting edema     (  ) non-pitting edema   (  ) ulceration     (  ) gangrene:     (location:     )    NERVOUS SYSTEM  ( X ) A&Ox3     (  ) confused     (  ) lethargic  CN II-XII:     (  ) Intact     (  ) focal deficits  (Specify:     )   Upper extremities:     (  ) strength X/5     (  ) focal deficit (specify:    )  Lower extremities:     (  ) strength  X/5    (  ) focal deficit (specify:    )    SKIN  (  ) No rashes or lesions     (  ) maculopapular rash     (  ) pustules     (  ) vesicles     (  ) ulcer     (  ) ecchymosis     (specify location:     )      LABS             8.7    4.91  )-----------( 293      ( 04-14-25 @ 07:18 )             26.4           PT/INR - ( 04-14-25 @ 07:18 )   PT: 13.20 sec[H];   INR: 1.12 ratio        
SUBJECTIVE/OVERNIGHT EVENTS  Today is hospital day 9d. This morning patient was seen and examined at bedside, resting comfortably in bed. No acute or major events overnight.      MEDICATIONS  STANDING MEDICATIONS  chlorhexidine 2% Cloths 1 Application(s) Topical daily  enoxaparin Injectable 70 milliGRAM(s) SubCutaneous every 12 hours  FLUoxetine 40 milliGRAM(s) Oral daily  hydroxychloroquine 200 milliGRAM(s) Oral two times a day  influenza   Vaccine 0.5 milliLiter(s) IntraMuscular once  pantoprazole    Tablet 40 milliGRAM(s) Oral before breakfast  warfarin 7.5 milliGRAM(s) Oral once    PRN MEDICATIONS  morphine  - Injectable 2 milliGRAM(s) IV Push every 4 hours PRN  ondansetron Injectable 4 milliGRAM(s) IV Push every 8 hours PRN    VITALS  Vital Signs Last 24 Hrs  T(C): 36.7 (16 Apr 2025 07:00), Max: 37.3 (16 Apr 2025 00:34)  T(F): 98.1 (16 Apr 2025 07:00), Max: 99.2 (16 Apr 2025 00:34)  HR: 76 (16 Apr 2025 07:00) (76 - 89)  BP: 111/71 (16 Apr 2025 07:00) (107/66 - 111/71)  BP(mean): --  RR: 19 (16 Apr 2025 07:00) (18 - 19)  SpO2: 95% (16 Apr 2025 07:00) (95% - 98%)    Parameters below as of 16 Apr 2025 07:00  Patient On (Oxygen Delivery Method): room air    PHYSICAL EXAM  GENERAL  ( X ) NAD, lying in bed comfortably     (  ) obtunded     (  ) lethargic     (  ) somnolent    HEAD  (X  ) Atraumatic     (  ) hematoma     (  ) laceration (specify location:       )     NECK  ( X ) Supple     (  ) neck stiffness     (  ) nuchal rigidity     (  )  no JVD     (  ) JVD present ( -- cm)    HEART  Rate -->  (  ) normal rate    (  ) bradycardic    (  ) tachycardic  Rhythm -->  (  ) regular    (  ) regularly irregular    (  ) irregularly irregular  Murmurs -->  (X  ) normal s1/s2    (  ) systolic murmur    (  ) diastolic murmur    (  ) continuous murmur     (  ) S3 present    (  ) S4 present    LUNGS  (  )Unlabored respirations     (  ) tachypnea  ( X ) B/L air entry     (  ) decreased breath sounds in:  (location     )    (  ) no adventitious sound     (  ) crackles     (  ) wheezing      (  ) rhonchi      (specify location:       )  (  ) chest wall tenderness (specify location:       )    ABDOMEN  (X  ) Soft     (  ) tense   |   ( X ) nondistended     (  ) distended   |   ( X ) +BS     (  ) hypoactive bowel sounds     (  ) hyperactive bowel sounds  (  ) nontender     (  ) RUQ tenderness     (  ) RLQ tenderness     (  ) LLQ tenderness     (  ) epigastric tenderness     (  ) diffuse tenderness  (  ) Splenomegaly      (  ) Hepatomegaly      (  ) Jaundice     (  ) ecchymosis  ( X ) RT  Paraspinal swelling    EXTREMITIES  ( X ) Normal     (  ) Rash     (  ) ecchymosis     (  ) varicose veins      (  ) pitting edema     (  ) non-pitting edema   (  ) ulceration     (  ) gangrene:     (location:     )    NERVOUS SYSTEM  ( X ) A&Ox3     (  ) confused     (  ) lethargic  CN II-XII:     (  ) Intact     (  ) focal deficits  (Specify:     )   Upper extremities:     (  ) strength X/5     (  ) focal deficit (specify:    )  Lower extremities:     (  ) strength  X/5    (  ) focal deficit (specify:    )    SKIN  (  ) No rashes or lesions     (  ) maculopapular rash     (  ) pustules     (  ) vesicles     (  ) ulcer     (  ) ecchymosis     (specify location:     )      LABS            LABS:                          9.3    3.68  )-----------( 280      ( 16 Apr 2025 07:15 )             28.4             PT/INR - ( 16 Apr 2025 07:15 )   PT: 16.60 sec;   INR: 1.40 ratio         PTT - ( 16 Apr 2025 07:15 )  PTT:40.0 sec            
  HENRY SANDERS  64y  Female      Patient is a 64y old  Female who presents with a chief complaint of back pain    INTERVAL HPI/OVERNIGHT EVENTS:  Patient seen and examined earlier this morning  lying comfortably in bed  in nad  states back pain improved  coumadin 5mg last night and tonight    Vital Signs Last 24 Hrs  T(C): 36.8 (13 Apr 2025 07:35), Max: 36.9 (12 Apr 2025 16:00)  T(F): 98.2 (13 Apr 2025 07:35), Max: 98.5 (13 Apr 2025 00:00)  HR: 66 (13 Apr 2025 07:35) (66 - 79)  BP: 102/62 (13 Apr 2025 07:35) (92/52 - 102/62)  BP(mean): --  RR: 18 (13 Apr 2025 07:35) (18 - 18)  SpO2: 99% (13 Apr 2025 07:35) (99% - 99%)    Parameters below as of 13 Apr 2025 07:35  Patient On (Oxygen Delivery Method): room air      PHYSICAL EXAM:  GENERAL: NAD, well-groomed,   HEAD:  Atraumatic, Normocephalic  EYES: conjunctiva and sclera clear  ENMT: Moist mucous membranes,  No visible lesions  NECK: Supple, No JVD, Normal thyroid  NERVOUS SYSTEM:  Alert & Oriented X3, Good concentration; moves all extremities   CHEST/LUNG: good air entry   HEART: Regular rate and rhythm; No murmurs, rubs, or gallops  ABDOMEN: Soft, Nontender, Nondistended; Bowel sounds present  EXTREMITIES:  2+ Peripheral Pulses, No clubbing, cyanosis, or edema  LYMPH: No lymphadenopathy noted  SKIN: ecchymosis to back - not extending beyond demarkation     Consultant(s) Notes Reviewed:  [x ] YES  [ ] NO  Care Discussed with Consultants/Other Providers [ x] YES  [ ] NO    LAB:                                   8.4    3.93  )-----------( 306      ( 13 Apr 2025 07:34 )             25.3       PT/INR - ( 13 Apr 2025 07:34 )   PT: 12.20 sec;   INR: 1.03 ratio        Drug Dosing Weight  Height (cm): 157.5 (09 Apr 2025 20:43)  Weight (kg): 72.6 (07 Apr 2025 13:30)  BMI (kg/m2): 29.3 (09 Apr 2025 20:43)  BSA (m2): 1.74 (09 Apr 2025 20:43)    Urinalysis Basic - ( 11 Apr 2025 06:30 )    Color: x / Appearance: x / SG: x / pH: x  Gluc: 99 mg/dL / Ketone: x  / Bili: x / Urobili: x   Blood: x / Protein: x / Nitrite: x   Leuk Esterase: x / RBC: x / WBC x   Sq Epi: x / Non Sq Epi: x / Bacteria: x      RADIOLOGY & ADDITIONAL TESTS:  Imaging Personally Reviewed:  [x] YES  [ ] NO        MEDICATIONS  (STANDING):  chlorhexidine 2% Cloths 1 Application(s) Topical daily  enoxaparin Injectable 70 milliGRAM(s) SubCutaneous every 12 hours  FLUoxetine 40 milliGRAM(s) Oral daily  hydroxychloroquine 200 milliGRAM(s) Oral two times a day  influenza   Vaccine 0.5 milliLiter(s) IntraMuscular once  pantoprazole    Tablet 40 milliGRAM(s) Oral before breakfast  warfarin 5 milliGRAM(s) Oral once    MEDICATIONS  (PRN):  morphine  - Injectable 2 milliGRAM(s) IV Push every 4 hours PRN Severe Pain (7 - 10)  ondansetron Injectable 4 milliGRAM(s) IV Push every 8 hours PRN Nausea and/or Vomiting      
  HENRY SANDERS  64y  Female      Patient is a 64y old  Female who presents with a chief complaint of back pain    INTERVAL HPI/OVERNIGHT EVENTS:  Patient seen and examined earlier this morning  lying comfortably in bed  in nad  states back pain is present but improved    T(C): 36.5 (04-11-25 @ 07:30), Max: 37.2 (04-10-25 @ 11:15)  HR: 75 (04-11-25 @ 07:30) (75 - 82)  BP: 110/63 (04-11-25 @ 07:30) (96/60 - 135/63)  RR: 18 (04-11-25 @ 01:03) (18 - 19)  SpO2: 96% (04-11-25 @ 07:30) (96% - 97%)    PHYSICAL EXAM:  GENERAL: NAD, well-groomed,   HEAD:  Atraumatic, Normocephalic  EYES: conjunctiva and sclera clear  ENMT: Moist mucous membranes,  No visible lesions  NECK: Supple, No JVD, Normal thyroid  NERVOUS SYSTEM:  Alert & Oriented X3, Good concentration; moves all extremities   CHEST/LUNG: good air entry   HEART: Regular rate and rhythm; No murmurs, rubs, or gallops  ABDOMEN: Soft, Nontender, Nondistended; Bowel sounds present  EXTREMITIES:  2+ Peripheral Pulses, No clubbing, cyanosis, or edema  LYMPH: No lymphadenopathy noted  SKIN: ecchymosis to back - not extending beyond demarkation     Consultant(s) Notes Reviewed:  [x ] YES  [ ] NO  Care Discussed with Consultants/Other Providers [ x] YES  [ ] NO    LAB:                        8.6    5.52  )-----------( 274      ( 11 Apr 2025 06:30 )             26.1     04-11    138  |  106  |  14  ----------------------------<  99  4.5   |  24  |  0.7    Ca    8.2[L]      11 Apr 2025 06:30  Phos  3.1     04-09  Mg     2.1     04-11        Drug Dosing Weight  Height (cm): 157.5 (09 Apr 2025 20:43)  Weight (kg): 72.6 (07 Apr 2025 13:30)  BMI (kg/m2): 29.3 (09 Apr 2025 20:43)  BSA (m2): 1.74 (09 Apr 2025 20:43)    I&O's Summary    10 Apr 2025 07:01  -  11 Apr 2025 07:00  --------------------------------------------------------  IN: 198 mL / OUT: 0 mL / NET: 198 mL      Urinalysis Basic - ( 11 Apr 2025 06:30 )    Color: x / Appearance: x / SG: x / pH: x  Gluc: 99 mg/dL / Ketone: x  / Bili: x / Urobili: x   Blood: x / Protein: x / Nitrite: x   Leuk Esterase: x / RBC: x / WBC x   Sq Epi: x / Non Sq Epi: x / Bacteria: x      RADIOLOGY & ADDITIONAL TESTS:  Imaging Personally Reviewed:  [x] YES  [ ] NO        MEDS:  chlorhexidine 2% Cloths 1 Application(s) Topical daily  FLUoxetine 40 milliGRAM(s) Oral daily  heparin   Injectable 6000 Unit(s) IV Push every 6 hours PRN  heparin   Injectable 3000 Unit(s) IV Push every 6 hours PRN  heparin  Infusion.  Unit(s)/Hr IV Continuous <Continuous>  hydroxychloroquine 200 milliGRAM(s) Oral two times a day  influenza   Vaccine 0.5 milliLiter(s) IntraMuscular once  morphine  - Injectable 2 milliGRAM(s) IV Push every 4 hours PRN  ondansetron Injectable 4 milliGRAM(s) IV Push every 8 hours PRN  pantoprazole    Tablet 40 milliGRAM(s) Oral before breakfast  warfarin 1 milliGRAM(s) Oral once      
  HENRY SANDERS  64y  Female      Patient is a 64y old Female who presents with a chief complaint of back pain    INTERVAL HPI/OVERNIGHT EVENTS:  Patient seen and examined earlier this morning  lying comfortably in bed  in nad  states back pain improved  asking to ambulate  eager to go home  will give another dose of coumadin tonight    Vital Signs Last 24 Hrs  T(C): 36.8 (14 Apr 2025 08:00), Max: 37 (14 Apr 2025 00:00)  T(F): 98.2 (14 Apr 2025 08:00), Max: 98.6 (14 Apr 2025 00:00)  HR: 80 (14 Apr 2025 08:00) (72 - 80)  BP: 101/63 (14 Apr 2025 08:00) (87/48 - 108/68)  BP(mean): --  RR: 17 (14 Apr 2025 08:00) (17 - 18)  SpO2: 100% (14 Apr 2025 08:00) (95% - 100%)    Parameters below as of 14 Apr 2025 08:00  Patient On (Oxygen Delivery Method): room air      PHYSICAL EXAM:  GENERAL: NAD, well-groomed,   HEAD:  Atraumatic, Normocephalic  EYES: conjunctiva and sclera clear  ENMT: Moist mucous membranes,  No visible lesions  NECK: Supple, No JVD, Normal thyroid  NERVOUS SYSTEM:  Alert & Oriented X3, Good concentration; moves all extremities   CHEST/LUNG: good air entry   HEART: Regular rate and rhythm; No murmurs, rubs, or gallops  ABDOMEN: Soft, Nontender, Nondistended; Bowel sounds present  EXTREMITIES:  2+ Peripheral Pulses, No clubbing, cyanosis, or edema  LYMPH: No lymphadenopathy noted  SKIN: ecchymosis to back - not extending beyond demarkation     Consultant(s) Notes Reviewed:  [x ] YES  [ ] NO  Care Discussed with Consultants/Other Providers [ x] YES  [ ] NO    LAB:                                          8.7    4.91  )-----------( 293      ( 14 Apr 2025 07:18 )             26.4     PT/INR - ( 14 Apr 2025 07:18 )   PT: 13.20 sec;   INR: 1.12 ratio           Drug Dosing Weight  Height (cm): 157.5 (09 Apr 2025 20:43)  Weight (kg): 72.6 (07 Apr 2025 13:30)  BMI (kg/m2): 29.3 (09 Apr 2025 20:43)  BSA (m2): 1.74 (09 Apr 2025 20:43)    Urinalysis Basic - ( 11 Apr 2025 06:30 )    Color: x / Appearance: x / SG: x / pH: x  Gluc: 99 mg/dL / Ketone: x  / Bili: x / Urobili: x   Blood: x / Protein: x / Nitrite: x   Leuk Esterase: x / RBC: x / WBC x   Sq Epi: x / Non Sq Epi: x / Bacteria: x      RADIOLOGY & ADDITIONAL TESTS:  Imaging Personally Reviewed:  [x] YES  [ ] NO      MEDICATIONS  (STANDING):  chlorhexidine 2% Cloths 1 Application(s) Topical daily  enoxaparin Injectable 70 milliGRAM(s) SubCutaneous every 12 hours  FLUoxetine 40 milliGRAM(s) Oral daily  hydroxychloroquine 200 milliGRAM(s) Oral two times a day  influenza   Vaccine 0.5 milliLiter(s) IntraMuscular once  pantoprazole    Tablet 40 milliGRAM(s) Oral before breakfast  warfarin 7.5 milliGRAM(s) Oral once    MEDICATIONS  (PRN):  morphine  - Injectable 2 milliGRAM(s) IV Push every 4 hours PRN Severe Pain (7 - 10)  ondansetron Injectable 4 milliGRAM(s) IV Push every 8 hours PRN Nausea and/or Vomiting    
  HENRY SANDERS  64y  [facility]      Patient is a 64y old  Female who presents with a chief complaint of     My note supersedes the resident's note      INTERVAL HPI/OVERNIGHT EVENTS:    no acute events overnight     REVIEW OF SYSTEMS:  CONSTITUTIONAL: No fever, weight loss, or fatigue  EYES: No eye pain, visual disturbances, or discharge  ENMT:  No difficulty hearing, tinnitus, vertigo; No sinus or throat pain  NECK: No pain or stiffness  BREASTS: No pain, masses, or nipple discharge  RESPIRATORY: No cough, wheezing, chills or hemoptysis; No shortness of breath  CARDIOVASCULAR: No chest pain, palpitations, dizziness, or leg swelling  GASTROINTESTINAL: No abdominal or epigastric pain. No nausea, vomiting, or hematemesis; No diarrhea or constipation. No melena or hematochezia.  GENITOURINARY: No dysuria, frequency, hematuria, or incontinence  NEUROLOGICAL: No headaches, memory loss, loss of strength, numbness, or tremors  SKIN: No itching, burning, rashes, or lesions   LYMPH NODES: No enlarged glands  ENDOCRINE: No heat or cold intolerance; No hair loss  MUSCULOSKELETAL: No joint pain or swelling; No muscle, back, or extremity pain  PSYCHIATRIC: No depression, anxiety, mood swings, or difficulty sleeping  HEME/LYMPH: No easy bruising, or bleeding gums  ALLERY AND IMMUNOLOGIC: No hives or eczema  FAMILY HISTORY:    T(C): 36.7 (04-16-25 @ 07:00), Max: 37.3 (04-16-25 @ 00:34)  HR: 76 (04-16-25 @ 07:00) (76 - 89)  BP: 111/71 (04-16-25 @ 07:00) (107/66 - 111/71)  RR: 19 (04-16-25 @ 07:00) (18 - 19)  SpO2: 95% (04-16-25 @ 07:00) (95% - 98%)  Wt(kg): --Vital Signs Last 24 Hrs  T(C): 36.7 (16 Apr 2025 07:00), Max: 37.3 (16 Apr 2025 00:34)  T(F): 98.1 (16 Apr 2025 07:00), Max: 99.2 (16 Apr 2025 00:34)  HR: 76 (16 Apr 2025 07:00) (76 - 89)  BP: 111/71 (16 Apr 2025 07:00) (107/66 - 111/71)  BP(mean): --  RR: 19 (16 Apr 2025 07:00) (18 - 19)  SpO2: 95% (16 Apr 2025 07:00) (95% - 98%)    Parameters below as of 16 Apr 2025 07:00  Patient On (Oxygen Delivery Method): room air        PHYSICAL EXAM:  GENERAL: NAD,   HEAD:  Atraumatic, Normocephalic  EYES: EOMI, PERRLA, conjunctiva and sclera clear  ENMT: No tonsillar erythema, exudates, or enlargement; Moist mucous membranes, Good dentition, No lesions  NECK: Supple, No JVD, Normal thyroid  NERVOUS SYSTEM:  Alert & Oriented X3, Good concentration; Motor Strength 5/5 B/L upper and lower extremities; DTRs 2+ intact and symmetric  PULM: Clear to auscultation bilaterally  CARDIAC: Regular rate and rhythm; No murmurs, rubs, or gallops  GI: Soft, Nontender, Nondistended; Bowel sounds present  EXTREMITIES:  2+ Peripheral Pulses, No clubbing, cyanosis, or edema  LYMPH: No lymphadenopathy noted  SKIN: No rashes or lesions , stable ecchymosis on back     Consultant(s) Notes Reviewed:  [x ] YES  [ ] NO  Care Discussed with Consultants/Other Providers [ x] YES  [ ] NO    LABS:                            9.3    3.68  )-----------( 280      ( 16 Apr 2025 07:15 )             28.4                 chlorhexidine 2% Cloths 1 Application(s) Topical daily  enoxaparin Injectable 70 milliGRAM(s) SubCutaneous every 12 hours  FLUoxetine 40 milliGRAM(s) Oral daily  hydroxychloroquine 200 milliGRAM(s) Oral two times a day  influenza   Vaccine 0.5 milliLiter(s) IntraMuscular once  ondansetron Injectable 4 milliGRAM(s) IV Push every 8 hours PRN  pantoprazole    Tablet 40 milliGRAM(s) Oral before breakfast      HEALTH ISSUES - PROBLEM Dx:          Case Discussed with House Staff   50 minutes spent on total time on encounter , this excludes teaching   Spectra [phone]  
SUBJECTIVE/OVERNIGHT EVENTS  Today is hospital day 2d. This morning patient was seen and examined at bedside, resting comfortably in bed. No acute or major events overnight.      MEDICATIONS  MEDICATIONS  (STANDING):  cefTRIAXone   IVPB 1000 milliGRAM(s) IV Intermittent every 24 hours  chlorhexidine 2% Cloths 1 Application(s) Topical daily  FLUoxetine 40 milliGRAM(s) Oral daily  heparin  Infusion.  Unit(s)/Hr (13 mL/Hr) IV Continuous <Continuous>  hydroxychloroquine 200 milliGRAM(s) Oral two times a day  influenza   Vaccine 0.5 milliLiter(s) IntraMuscular once  pantoprazole    Tablet 40 milliGRAM(s) Oral before breakfast    MEDICATIONS  (PRN):  heparin   Injectable 6000 Unit(s) IV Push every 6 hours PRN For aPTT less than 40  heparin   Injectable 3000 Unit(s) IV Push every 6 hours PRN For aPTT between 40 - 57  morphine  - Injectable 2 milliGRAM(s) IV Push every 4 hours PRN Severe Pain (7 - 10)  ondansetron Injectable 4 milliGRAM(s) IV Push every 8 hours PRN Nausea and/or Vomiting      VITALS  Vital Signs Last 24 Hrs  T(C): 36.8 (09 Apr 2025 08:00), Max: 38.2 (08 Apr 2025 15:00)  T(F): 98.2 (09 Apr 2025 08:00), Max: 100.8 (08 Apr 2025 15:00)  HR: 79 (09 Apr 2025 08:00) (79 - 87)  BP: 101/58 (09 Apr 2025 08:00) (98/61 - 106/69)  BP(mean): --  RR: 18 (09 Apr 2025 08:00) (18 - 19)  SpO2: 98% (09 Apr 2025 08:00) (98% - 98%)    Parameters below as of 09 Apr 2025 08:00  Patient On (Oxygen Delivery Method): room air    PHYSICAL EXAM  GENERAL  ( X ) NAD, lying in bed comfortably     (  ) obtunded     (  ) lethargic     (  ) somnolent    HEAD  (X  ) Atraumatic     (  ) hematoma     (  ) laceration (specify location:       )     NECK  (X  ) Supple     (  ) neck stiffness     (  ) nuchal rigidity     (  )  no JVD     (  ) JVD present ( -- cm)    HEART  Rate -->  (  ) normal rate    (  ) bradycardic    (  ) tachycardic  Rhythm -->  (  ) regular    (  ) regularly irregular    (  ) irregularly irregular  Murmurs -->  ( X ) normal s1/s2    (  ) systolic murmur    (  ) diastolic murmur    (  ) continuous murmur     (  ) S3 present    (  ) S4 present    LUNGS  (  )Unlabored respirations     (  ) tachypnea  (X  ) B/L air entry     (  ) decreased breath sounds in:  (location     )    (  ) no adventitious sound     (  ) crackles     (  ) wheezing      (  ) rhonchi      (specify location:       )  (  ) chest wall tenderness (specify location:       )    ABDOMEN  (  ) Soft     (  ) tense   |   (  ) nondistended     (  ) distended   |   (  ) +BS     (  ) hypoactive bowel sounds     (  ) hyperactive bowel sounds  (  ) nontender     (  ) RUQ tenderness     (  ) RLQ tenderness     (  ) LLQ tenderness     (  ) epigastric tenderness     (  ) diffuse tenderness  (  ) Splenomegaly      (  ) Hepatomegaly      (  ) Jaundice     (  ) ecchymosis   ( X ) large right sided swelling over the back, tender on deep palpation associated with eccymosis)    EXTREMITIES  (  X) Normal     (  ) Rash     (  ) ecchymosis     (  ) varicose veins      (  ) pitting edema     (  ) non-pitting edema   (  ) ulceration     (  ) gangrene:     (location:     )    NERVOUS SYSTEM  ( X ) A&Ox3     (  ) confused     (  ) lethargic  CN II-XII:     (  ) Intact     (  ) focal deficits  (Specify:     )   Upper extremities:     (  ) strength X/5     (  ) focal deficit (specify:    )  Lower extremities:     (  ) strength  X/5    (  ) focal deficit (specify:    )    SKIN  (  ) No rashes or lesions     (  ) maculopapular rash     (  ) pustules     (  ) vesicles     (  ) ulcer     (  ) ecchymosis     (specify location:     )    (  ) Indwelling Tsreet Catheter    LABS:                          7.4    6.17  )-----------( 235      ( 09 Apr 2025 06:14 )             21.8     04-09    135  |  100  |  15  ----------------------------<  104[H]  3.8   |  23  |  0.7    Ca    8.1[L]      09 Apr 2025 06:14  Phos  3.0     04-07  Mg     2.1     04-09    TPro  6.6  /  Alb  3.5  /  TBili  0.7  /  DBili  x   /  AST  53[H]  /  ALT  28  /  AlkPhos  49  04-08    LIVER FUNCTIONS - ( 08 Apr 2025 05:51 )  Alb: 3.5 g/dL / Pro: 6.6 g/dL / ALK PHOS: 49 U/L / ALT: 28 U/L / AST: 53 U/L / GGT: x           PT/INR - ( 09 Apr 2025 06:14 )   PT: 13.40 sec;   INR: 1.13 ratio         PTT - ( 09 Apr 2025 05:43 )  PTT:62.5 sec  Urinalysis Basic - ( 09 Apr 2025 06:14 )    Color: x / Appearance: x / SG: x / pH: x  Gluc: 104 mg/dL / Ketone: x  / Bili: x / Urobili: x   Blood: x / Protein: x / Nitrite: x   Leuk Esterase: x / RBC: x / WBC x   Sq Epi: x / Non Sq Epi: x / Bacteria: x       Date insterted:    Reason (  ) Critical illness     (  ) urinary retention    (  ) Accurate Ins/Outs Monitoring     (  ) CMO patient    (  ) Central Line  Date inserted:  Location: (  ) Right IJ   (  ) Left IJ   (  ) Right Fem   (  ) Left Fem    (  ) SPC  (  ) pigtail  (  ) PEG tube  (  ) colostomy  (  ) jejunostomy  (  ) U-Dall    LABS             
  HENRY SANDERS  64y  Female      Patient is a 64y old  Female who presents with a chief complaint of back pain    INTERVAL HPI/OVERNIGHT EVENTS:  Patient seen and examined earlier this morning  lying comfortably in bed  in nad  states back pain is present but improved  resumed coumadin last night but received a very low dose    Vital Signs Last 24 Hrs  T(C): 36.6 (12 Apr 2025 07:30), Max: 36.8 (11 Apr 2025 16:21)  T(F): 97.8 (12 Apr 2025 07:30), Max: 98.2 (11 Apr 2025 16:21)  HR: 73 (12 Apr 2025 07:30) (72 - 80)  BP: 100/59 (12 Apr 2025 07:30) (99/62 - 129/71)  BP(mean): --  RR: 18 (12 Apr 2025 07:30) (18 - 18)  SpO2: 98% (12 Apr 2025 07:30) (96% - 98%)    Parameters below as of 12 Apr 2025 07:30  Patient On (Oxygen Delivery Method): room air      PHYSICAL EXAM:  GENERAL: NAD, well-groomed,   HEAD:  Atraumatic, Normocephalic  EYES: conjunctiva and sclera clear  ENMT: Moist mucous membranes,  No visible lesions  NECK: Supple, No JVD, Normal thyroid  NERVOUS SYSTEM:  Alert & Oriented X3, Good concentration; moves all extremities   CHEST/LUNG: good air entry   HEART: Regular rate and rhythm; No murmurs, rubs, or gallops  ABDOMEN: Soft, Nontender, Nondistended; Bowel sounds present  EXTREMITIES:  2+ Peripheral Pulses, No clubbing, cyanosis, or edema  LYMPH: No lymphadenopathy noted  SKIN: ecchymosis to back - not extending beyond demarkation     Consultant(s) Notes Reviewed:  [x ] YES  [ ] NO  Care Discussed with Consultants/Other Providers [ x] YES  [ ] NO    LAB:                                     8.3    4.13  )-----------( 286      ( 12 Apr 2025 08:19 )             25.4         Drug Dosing Weight  Height (cm): 157.5 (09 Apr 2025 20:43)  Weight (kg): 72.6 (07 Apr 2025 13:30)  BMI (kg/m2): 29.3 (09 Apr 2025 20:43)  BSA (m2): 1.74 (09 Apr 2025 20:43)    Urinalysis Basic - ( 11 Apr 2025 06:30 )    Color: x / Appearance: x / SG: x / pH: x  Gluc: 99 mg/dL / Ketone: x  / Bili: x / Urobili: x   Blood: x / Protein: x / Nitrite: x   Leuk Esterase: x / RBC: x / WBC x   Sq Epi: x / Non Sq Epi: x / Bacteria: x      RADIOLOGY & ADDITIONAL TESTS:  Imaging Personally Reviewed:  [x] YES  [ ] NO        MEDICATIONS  (STANDING):  chlorhexidine 2% Cloths 1 Application(s) Topical daily  FLUoxetine 40 milliGRAM(s) Oral daily  heparin  Infusion.  Unit(s)/Hr (13 mL/Hr) IV Continuous <Continuous>  hydroxychloroquine 200 milliGRAM(s) Oral two times a day  influenza   Vaccine 0.5 milliLiter(s) IntraMuscular once  pantoprazole    Tablet 40 milliGRAM(s) Oral before breakfast    MEDICATIONS  (PRN):  heparin   Injectable 6000 Unit(s) IV Push every 6 hours PRN For aPTT less than 40  heparin   Injectable 3000 Unit(s) IV Push every 6 hours PRN For aPTT between 40 - 57  morphine  - Injectable 2 milliGRAM(s) IV Push every 4 hours PRN Severe Pain (7 - 10)  ondansetron Injectable 4 milliGRAM(s) IV Push every 8 hours PRN Nausea and/or Vomiting    
SUBJECTIVE/OVERNIGHT EVENTS  Today is hospital day 3d. This morning patient was seen and examined at bedside, resting comfortably in bed. No acute or major events overnight.      MEDICATIONS  MEDICATIONS  (STANDING):  chlorhexidine 2% Cloths 1 Application(s) Topical daily  FLUoxetine 40 milliGRAM(s) Oral daily  heparin  Infusion.  Unit(s)/Hr (13 mL/Hr) IV Continuous <Continuous>  hydroxychloroquine 200 milliGRAM(s) Oral two times a day  influenza   Vaccine 0.5 milliLiter(s) IntraMuscular once  pantoprazole    Tablet 40 milliGRAM(s) Oral before breakfast  potassium chloride   Powder 40 milliEquivalent(s) Oral every 2 hours    MEDICATIONS  (PRN):  heparin   Injectable 6000 Unit(s) IV Push every 6 hours PRN For aPTT less than 40  heparin   Injectable 3000 Unit(s) IV Push every 6 hours PRN For aPTT between 40 - 57  morphine  - Injectable 2 milliGRAM(s) IV Push every 4 hours PRN Severe Pain (7 - 10)  ondansetron Injectable 4 milliGRAM(s) IV Push every 8 hours PRN Nausea and/or Vomiting        VITALS  Vital Signs Last 24 Hrs  T(C): 36.8 (09 Apr 2025 08:00), Max: 38.2 (08 Apr 2025 15:00)  T(F): 98.2 (09 Apr 2025 08:00), Max: 100.8 (08 Apr 2025 15:00)  HR: 79 (09 Apr 2025 08:00) (79 - 87)  BP: 101/58 (09 Apr 2025 08:00) (98/61 - 106/69)  BP(mean): --  RR: 18 (09 Apr 2025 08:00) (18 - 19)  SpO2: 98% (09 Apr 2025 08:00) (98% - 98%)    Parameters below as of 09 Apr 2025 08:00  Patient On (Oxygen Delivery Method): room air    PHYSICAL EXAM  GENERAL  ( X ) NAD, lying in bed comfortably     (  ) obtunded     (  ) lethargic     (  ) somnolent    HEAD  (X  ) Atraumatic     (  ) hematoma     (  ) laceration (specify location:       )     NECK  (X  ) Supple     (  ) neck stiffness     (  ) nuchal rigidity     (  )  no JVD     (  ) JVD present ( -- cm)    HEART  Rate -->  (  ) normal rate    (  ) bradycardic    (  ) tachycardic  Rhythm -->  (  ) regular    (  ) regularly irregular    (  ) irregularly irregular  Murmurs -->  ( X ) normal s1/s2    (  ) systolic murmur    (  ) diastolic murmur    (  ) continuous murmur     (  ) S3 present    (  ) S4 present    LUNGS  (  )Unlabored respirations     (  ) tachypnea  (X  ) B/L air entry     (  ) decreased breath sounds in:  (location     )    (  ) no adventitious sound     (  ) crackles     (  ) wheezing      (  ) rhonchi      (specify location:       )  (  ) chest wall tenderness (specify location:       )    ABDOMEN  (  ) Soft     (  ) tense   |   (  ) nondistended     (  ) distended   |   (  ) +BS     (  ) hypoactive bowel sounds     (  ) hyperactive bowel sounds  (  ) nontender     (  ) RUQ tenderness     (  ) RLQ tenderness     (  ) LLQ tenderness     (  ) epigastric tenderness     (  ) diffuse tenderness  (  ) Splenomegaly      (  ) Hepatomegaly      (  ) Jaundice     (  ) ecchymosis   ( X ) large right sided swelling over the back, tender on deep palpation associated with eccymosis)    EXTREMITIES  (  X) Normal     (  ) Rash     (  ) ecchymosis     (  ) varicose veins      (  ) pitting edema     (  ) non-pitting edema   (  ) ulceration     (  ) gangrene:     (location:     )    LABS:                          7.2    5.74  )-----------( 249      ( 10 Apr 2025 07:19 )             21.2     04-10    136  |  102  |  13  ----------------------------<  101[H]  3.9   |  24  |  0.7    Ca    8.1[L]      10 Apr 2025 07:19  Phos  3.1     04-09  Mg     2.0     04-10        PT/INR - ( 10 Apr 2025 07:19 )   PT: 12.70 sec;   INR: 1.07 ratio         PTT - ( 10 Apr 2025 03:22 )  PTT:103.3 sec  Urinalysis Basic - ( 10 Apr 2025 07:19 )    Color: x / Appearance: x / SG: x / pH: x  Gluc: 101 mg/dL / Ketone: x  / Bili: x / Urobili: x   Blood: x / Protein: x / Nitrite: x   Leuk Esterase: x / RBC: x / WBC x   Sq Epi: x / Non Sq Epi: x / Bacteria: x        NERVOUS SYSTEM  ( X ) A&Ox3     (  ) confused     (  ) lethargic  CN II-XII:     (  ) Intact     (  ) focal deficits  (Specify:     )   Upper extremities:     (  ) strength X/5     (  ) focal deficit (specify:    )  Lower extremities:     (  ) strength  X/5    (  ) focal deficit (specify:    )    SKIN  (  ) No rashes or lesions     (  ) maculopapular rash     (  ) pustules     (  ) vesicles     (  ) ulcer     (  ) ecchymosis     (specify location:     )    (  ) Indwelling Street Catheter    LABS:                         
SURGERY PROGRESS NOTE    24 HR EVENTS: Patient with stable paraspinal hematoma. On heparin drip 1400 units/hr, on DASH diet and IVF. Tolerating diet with no nausea or vomiting. Fever to Tmax of 100.8, otherwise vitals WNL and patient saturating well on RA. Hgb 7.9 from 8.3 (previous values 8.5, 8.6, 9.3). Labs otherwise stable.    OBJECTIVE:  Vital Signs Last 24 Hrs  T(C): 37 (09 Apr 2025 00:22), Max: 38.2 (08 Apr 2025 15:00)  T(F): 98.6 (09 Apr 2025 00:22), Max: 100.8 (08 Apr 2025 15:00)  HR: 87 (08 Apr 2025 15:00) (82 - 87)  BP: 98/61 (09 Apr 2025 00:22) (98/61 - 118/74)  BP(mean): --  RR: 19 (09 Apr 2025 00:22) (17 - 19)  SpO2: 98% (09 Apr 2025 00:22) (98% - 100%)    Parameters below as of 09 Apr 2025 00:22  Patient On (Oxygen Delivery Method): room air        I&O's Summary    08 Apr 2025 07:01  -  09 Apr 2025 07:00  --------------------------------------------------------  IN: 502 mL / OUT: 0 mL / NET: 502 mL        PHYSICAL EXAM:  General: no acute distress, awake and alert  CV: RRR on radial exam  Pulm: breathing well on room air, no acute respiratory distress  Abdomen: soft, nontender, no rebound or guarding  Ext: well perfused, no peripheral edema, no ROM or gross strength deficits  Neuro: alert and oriented x3, no focal sensory/motor deficits down the legs  Back: large right sided back hematoma with mild overlying ecchymosis, no evidence of skin breakdown/necrosis. Mildly tender to touch    LABS:                        7.4    6.17  )-----------( 235      ( 09 Apr 2025 06:14 )             21.8     04-08    134[L]  |  99  |  15  ----------------------------<  110[H]  4.3   |  25  |  0.7    Ca    8.3[L]      08 Apr 2025 05:51  Phos  3.0     04-07  Mg     1.9     04-07    TPro  6.6  /  Alb  3.5  /  TBili  0.7  /  DBili  x   /  AST  53[H]  /  ALT  28  /  AlkPhos  49  04-08    PT/INR - ( 09 Apr 2025 06:14 )   PT: 13.40 sec;   INR: 1.13 ratio         PTT - ( 09 Apr 2025 05:43 )  PTT:62.5 sec  Urinalysis Basic - ( 08 Apr 2025 05:51 )    Color: x / Appearance: x / SG: x / pH: x  Gluc: 110 mg/dL / Ketone: x  / Bili: x / Urobili: x   Blood: x / Protein: x / Nitrite: x   Leuk Esterase: x / RBC: x / WBC x   Sq Epi: x / Non Sq Epi: x / Bacteria: x        RADIOLOGY & ADDITIONAL STUDIES:

## 2025-04-16 NOTE — DISCHARGE NOTE NURSING/CASE MANAGEMENT/SOCIAL WORK - NSDCPEFALRISK_GEN_ALL_CORE
For information on Fall & Injury Prevention, visit: [email or web address] OR  [email or web address] OR  https://www.cdc.gov/steadi/patient.html

## 2025-04-16 NOTE — PHARMACOTHERAPY INTERVENTION NOTE - INTERVENTION TYPE RECOOMEND
Dose Optimization/Non-renal Dose Adjustment
Pharmacokinetics Evaluation
Pharmacokinetics Evaluation
Dose Optimization/Non-renal Dose Adjustment
Dose Optimization/Non-renal Dose Adjustment
Pharmacokinetics Evaluation
Dose Optimization/Non-renal Dose Adjustment
Dose Optimization/Non-renal Dose Adjustment
Pharmacokinetics Evaluation

## 2025-04-16 NOTE — PHARMACOTHERAPY INTERVENTION NOTE - COMMENTS
64yFemale    Indication: SLE/APS DVT/PE  INR Goal: 2-3  Home Dose: Warfarin 7.5mg Daily   Bridge Therapy:enoxaparin Injectable 70 milliGRAM(s) every 12 hours      Current Medications:  chlorhexidine 2% Cloths 1 Application(s) Topical daily  enoxaparin Injectable 70 milliGRAM(s) SubCutaneous every 12 hours  FLUoxetine 40 milliGRAM(s) Oral daily  hydroxychloroquine 200 milliGRAM(s) Oral two times a day  influenza   Vaccine 0.5 milliLiter(s) IntraMuscular once  ondansetron Injectable 4 milliGRAM(s) IV Push every 8 hours PRN  pantoprazole    Tablet 40 milliGRAM(s) Oral before breakfast      hemoglobin 9.3 g/dL (04-16-25 @ 07:15)    hematocrit 28.4 % (04-16-25 @ 07:15)    PLT: 280 K/uL (04-16-25 @ 07:15)    GFR:97 mL/min/1.73m2 (04-12-25 @ 08:19)      Drug Interactions:     INR trend  1.07 ratio (04-10-25 @ 07:19)  1.13 ratio (04-11-25 @ 06:30)  1.11 ratio (04-12-25 @ 08:19)  1.03 ratio (04-13-25 @ 07:34)  1.12 ratio (04-14-25 @ 07:18)  1.24 ratio (04-15-25 @ 04:30)  1.40 ratio (04-16-25 @ 07:15)      Warfarin administration history:  warfarin: 1 milliGRAM(s) (04-11-25 @ 21:28)  warfarin: 5 milliGRAM(s) (04-12-25 @ 21:08)  warfarin: 5 milliGRAM(s) (04-13-25 @ 21:14)  warfarin: 7.5 milliGRAM(s) (04-14-25 @ 21:46)  warfarin: 7.5 milliGRAM(s) (04-15-25 @ 21:42)        1. INR today is:  1.40             [ x ] below goal ----- This is likely due to warfarin doses being held as patient presented with elevated INR upon admission                                            [   ] at goal                                            [   ] above goal ------ This is likely due to        2. Discuss with provider(s) listed                                            [   x  ] to consider  Warfarin  7.5mg  PO x 1                                            [     ] provider recommended warfarin                 PO x 1                                           [     ] provider will order warfarin                     PO x 1     3. Obtain INR tomorrow AM

## 2025-04-23 DIAGNOSIS — M32.9 SYSTEMIC LUPUS ERYTHEMATOSUS, UNSPECIFIED: ICD-10-CM

## 2025-04-23 DIAGNOSIS — R79.89 OTHER SPECIFIED ABNORMAL FINDINGS OF BLOOD CHEMISTRY: ICD-10-CM

## 2025-04-23 DIAGNOSIS — Z86.711 PERSONAL HISTORY OF PULMONARY EMBOLISM: ICD-10-CM

## 2025-04-23 DIAGNOSIS — D68.32 HEMORRHAGIC DISORDER DUE TO EXTRINSIC CIRCULATING ANTICOAGULANTS: ICD-10-CM

## 2025-04-23 DIAGNOSIS — Z79.01 LONG TERM (CURRENT) USE OF ANTICOAGULANTS: ICD-10-CM

## 2025-04-23 DIAGNOSIS — Z88.0 ALLERGY STATUS TO PENICILLIN: ICD-10-CM

## 2025-04-23 DIAGNOSIS — M79.81 NONTRAUMATIC HEMATOMA OF SOFT TISSUE: ICD-10-CM

## 2025-04-23 DIAGNOSIS — R50.9 FEVER, UNSPECIFIED: ICD-10-CM

## 2025-04-23 DIAGNOSIS — E87.1 HYPO-OSMOLALITY AND HYPONATREMIA: ICD-10-CM

## 2025-04-23 DIAGNOSIS — Z86.718 PERSONAL HISTORY OF OTHER VENOUS THROMBOSIS AND EMBOLISM: ICD-10-CM

## 2025-04-23 DIAGNOSIS — F32.A DEPRESSION, UNSPECIFIED: ICD-10-CM

## 2025-04-23 DIAGNOSIS — N30.01 ACUTE CYSTITIS WITH HEMATURIA: ICD-10-CM

## 2025-04-23 DIAGNOSIS — D50.0 IRON DEFICIENCY ANEMIA SECONDARY TO BLOOD LOSS (CHRONIC): ICD-10-CM

## 2025-04-23 DIAGNOSIS — I50.9 HEART FAILURE, UNSPECIFIED: ICD-10-CM

## 2025-04-23 DIAGNOSIS — I50.22 CHRONIC SYSTOLIC (CONGESTIVE) HEART FAILURE: ICD-10-CM

## 2025-05-19 ENCOUNTER — APPOINTMENT (OUTPATIENT)
Dept: CARDIOLOGY | Facility: CLINIC | Age: 65
End: 2025-05-19

## 2025-05-19 PROCEDURE — 93306 TTE W/DOPPLER COMPLETE: CPT

## 2025-06-13 ENCOUNTER — APPOINTMENT (OUTPATIENT)
Dept: CARDIOLOGY | Facility: CLINIC | Age: 65
End: 2025-06-13

## 2025-06-13 VITALS
WEIGHT: 160 LBS | HEART RATE: 80 BPM | HEIGHT: 62 IN | BODY MASS INDEX: 29.44 KG/M2 | SYSTOLIC BLOOD PRESSURE: 110 MMHG | DIASTOLIC BLOOD PRESSURE: 68 MMHG | OXYGEN SATURATION: 97 %

## 2025-06-13 PROCEDURE — G2211 COMPLEX E/M VISIT ADD ON: CPT | Mod: NC

## 2025-06-13 PROCEDURE — 99214 OFFICE O/P EST MOD 30 MIN: CPT

## 2025-07-25 ENCOUNTER — APPOINTMENT (OUTPATIENT)
Dept: PODIATRY | Facility: CLINIC | Age: 65
End: 2025-07-25
Payer: COMMERCIAL

## 2025-07-25 DIAGNOSIS — M79.672 PAIN IN LEFT FOOT: ICD-10-CM

## 2025-07-25 DIAGNOSIS — M20.42 OTHER HAMMER TOE(S) (ACQUIRED), LEFT FOOT: ICD-10-CM

## 2025-07-25 DIAGNOSIS — B35.1 TINEA UNGUIUM: ICD-10-CM

## 2025-07-25 DIAGNOSIS — L60.2 ONYCHOGRYPHOSIS: ICD-10-CM

## 2025-07-25 DIAGNOSIS — I87.2 VENOUS INSUFFICIENCY (CHRONIC) (PERIPHERAL): ICD-10-CM

## 2025-07-25 DIAGNOSIS — M79.671 PAIN IN RIGHT FOOT: ICD-10-CM

## 2025-07-25 DIAGNOSIS — M20.41 OTHER HAMMER TOE(S) (ACQUIRED), RIGHT FOOT: ICD-10-CM

## 2025-07-25 PROCEDURE — 99213 OFFICE O/P EST LOW 20 MIN: CPT | Mod: 25

## 2025-07-25 PROCEDURE — 11721 DEBRIDE NAIL 6 OR MORE: CPT | Mod: Q9

## 2025-07-27 PROBLEM — B35.1 ONYCHOMYCOSIS: Status: ACTIVE | Noted: 2025-07-27

## 2025-07-27 PROBLEM — M20.41 ACQUIRED HAMMERTOE OF RIGHT FOOT: Status: ACTIVE | Noted: 2025-07-27

## 2025-07-27 PROBLEM — M20.42 ACQUIRED HAMMERTOE OF LEFT FOOT: Status: ACTIVE | Noted: 2025-07-27

## 2025-07-27 PROBLEM — L60.2 ONYCHOGRYPOSIS OF TOENAIL: Status: ACTIVE | Noted: 2025-07-27

## 2025-07-27 PROBLEM — M79.671 RIGHT FOOT PAIN: Status: ACTIVE | Noted: 2025-07-27

## 2025-07-27 PROBLEM — M79.672 LEFT FOOT PAIN: Status: ACTIVE | Noted: 2025-07-27

## 2025-07-27 PROBLEM — I87.2 VENOUS (PERIPHERAL) INSUFFICIENCY: Status: ACTIVE | Noted: 2025-06-12
